# Patient Record
Sex: FEMALE | Race: WHITE | Employment: OTHER | URBAN - METROPOLITAN AREA
[De-identification: names, ages, dates, MRNs, and addresses within clinical notes are randomized per-mention and may not be internally consistent; named-entity substitution may affect disease eponyms.]

---

## 2017-08-29 ENCOUNTER — TELEPHONE (OUTPATIENT)
Dept: CARDIOTHORACIC SURGERY | Age: 77
End: 2017-08-29

## 2018-08-17 ENCOUNTER — TELEPHONE (OUTPATIENT)
Dept: CARDIOTHORACIC SURGERY | Age: 78
End: 2018-08-17

## 2018-08-27 ENCOUNTER — TELEPHONE (OUTPATIENT)
Dept: CARDIOTHORACIC SURGERY | Age: 78
End: 2018-08-27

## 2018-09-11 ENCOUNTER — TELEPHONE (OUTPATIENT)
Dept: CARDIOTHORACIC SURGERY | Age: 78
End: 2018-09-11

## 2018-09-11 NOTE — TELEPHONE ENCOUNTER
Patient is in CT surveillance for ascending aortic aneurysm. Dr. Mary Haddad reviewed CT chest w/o contrast that was performed on 8/28/18 at [address] [place] [address] and states that the aorta is stable and we will repeat the same study in 1 year. Pt is aware and agreeable. Pt placed back into surveillance.

## 2019-09-16 ENCOUNTER — TELEPHONE (OUTPATIENT)
Dept: CARDIOTHORACIC SURGERY | Age: 79
End: 2019-09-16

## 2019-09-16 DIAGNOSIS — I71.21 ASCENDING AORTIC ANEURYSM: Primary | ICD-10-CM

## 2019-10-04 ENCOUNTER — TELEPHONE (OUTPATIENT)
Dept: CARDIOTHORACIC SURGERY | Age: 79
End: 2019-10-04

## 2020-03-13 ENCOUNTER — PREP FOR PROCEDURE (OUTPATIENT)
Dept: OPHTHALMOLOGY | Age: 80
End: 2020-03-13

## 2020-03-13 RX ORDER — PREDNISOLONE ACETATE 10 MG/ML
1 SUSPENSION/ DROPS OPHTHALMIC SEE ADMIN INSTRUCTIONS
Status: CANCELLED | OUTPATIENT
Start: 2020-03-13

## 2020-03-13 RX ORDER — TROPICAMIDE 10 MG/ML
1 SOLUTION/ DROPS OPHTHALMIC SEE ADMIN INSTRUCTIONS
Status: CANCELLED | OUTPATIENT
Start: 2020-03-13

## 2020-03-13 RX ORDER — BRIMONIDINE TARTRATE 2 MG/ML
1 SOLUTION/ DROPS OPHTHALMIC SEE ADMIN INSTRUCTIONS
Status: CANCELLED | OUTPATIENT
Start: 2020-03-13

## 2020-03-13 RX ORDER — PHENYLEPHRINE HYDROCHLORIDE 25 MG/ML
1 SOLUTION/ DROPS OPHTHALMIC SEE ADMIN INSTRUCTIONS
Status: CANCELLED | OUTPATIENT
Start: 2020-03-13

## 2020-06-12 ENCOUNTER — PREP FOR PROCEDURE (OUTPATIENT)
Dept: OPHTHALMOLOGY | Age: 80
End: 2020-06-12

## 2020-06-12 RX ORDER — PHENYLEPHRINE HYDROCHLORIDE 25 MG/ML
1 SOLUTION/ DROPS OPHTHALMIC SEE ADMIN INSTRUCTIONS
Status: CANCELLED | OUTPATIENT
Start: 2020-06-12

## 2020-06-12 RX ORDER — PREDNISOLONE ACETATE 10 MG/ML
1 SUSPENSION/ DROPS OPHTHALMIC SEE ADMIN INSTRUCTIONS
Status: CANCELLED | OUTPATIENT
Start: 2020-06-12

## 2020-06-12 RX ORDER — TROPICAMIDE 10 MG/ML
1 SOLUTION/ DROPS OPHTHALMIC SEE ADMIN INSTRUCTIONS
Status: CANCELLED | OUTPATIENT
Start: 2020-06-12

## 2020-06-12 RX ORDER — BRIMONIDINE TARTRATE 2 MG/ML
1 SOLUTION/ DROPS OPHTHALMIC SEE ADMIN INSTRUCTIONS
Status: CANCELLED | OUTPATIENT
Start: 2020-06-12

## 2020-06-12 NOTE — PROGRESS NOTES
[facility]  [phone]        Pre-Op Phone Call:     Patient Name: Reece Edmonds     Telephone Information:   Mobile [phone]     Home phone:  [phone]    Surgery Time:   [id number] Time:  1503     Left extended Message:  NA     Message left with:     Recent change in health status:  NA     Advised of transportation/ policy:  Call      NPO policy reviewed: Advised to take morning heart/blood pressure medications with sips of water morning of surgery? Instructed to bring eye drops, photo identification, and insurance card day of surgery? Advised to wear short sleeved button down shirt (no T-shirt underneath): Advised not to wear jewelry, hairpins, or pantyhose day of surgery? Advised not to wear make-up and to wash face day of surgery?       Remarks:        Electronically signed by:  Shoaib Colo RN at 6/12/2020 1:33 PM

## 2020-06-15 ENCOUNTER — HOSPITAL ENCOUNTER (OUTPATIENT)
Dept: SURGERY | Age: 80
Setting detail: OUTPATIENT SURGERY
Discharge: HOME | End: 2020-06-15
Payer: MEDICARE

## 2020-10-29 ENCOUNTER — HOSPITAL ENCOUNTER (OUTPATIENT)
Dept: CT IMAGING | Age: 80
Discharge: HOME | End: 2020-10-29
Payer: MEDICARE

## 2020-10-29 PROCEDURE — 71250 CT THORAX DX C-: CPT

## 2020-11-06 ENCOUNTER — TELEPHONE (OUTPATIENT)
Dept: CARDIOTHORACIC SURGERY | Age: 80
End: 2020-11-06

## 2020-11-06 NOTE — TELEPHONE ENCOUNTER
Patient is in surveillance for ascending aortic aneurysm. Dr. Betty Nichols reviewed CT chest w/o contrast performed on 10/29/20 and reports that the aorta is stable and we will repeat the same study in 1 year. Patient is aware and agreeable with plan.

## 2021-11-04 DIAGNOSIS — I71.20 THORACIC AORTIC ANEURYSM WITHOUT RUPTURE: Primary | ICD-10-CM

## 2021-11-17 ENCOUNTER — TELEPHONE (OUTPATIENT)
Dept: CARDIOTHORACIC SURGERY | Age: 81
End: 2021-11-17

## 2021-11-17 NOTE — TELEPHONE ENCOUNTER
Spoke with patient regarding schedule of updated CT chest without contrast for assessment of her ascending aortic aneurysm. Patient is requesting to be scheduled at [facility] authorization for updated scan: [id number] valid 11/17/2021-1/15/2022 per [facility]. I have faxed the orders with authorization to [facility] at [phone] with instructions for them to coordinate schedule with patient. I tried calling patient to relay instructions without success.

## 2021-12-03 ENCOUNTER — TELEPHONE (OUTPATIENT)
Dept: CARDIOTHORACIC SURGERY | Age: 81
End: 2021-12-03

## 2021-12-03 NOTE — TELEPHONE ENCOUNTER
Called patient to inform that Dr. Melina Hernandez reviewed results of CT scan. Patient's aneurysm is stable at this time. Scan should be repeated in 1 year ~ 11/22/2022. Patient agreeable, and verbalized understanding.

## 2023-02-17 ENCOUNTER — HOSPITAL ENCOUNTER (INPATIENT)
Age: 83
LOS: 1 days | Discharge: HOME HEALTH | DRG: 917 | End: 2023-02-18
Attending: EMERGENCY MEDICINE | Admitting: STUDENT IN AN ORGANIZED HEALTH CARE EDUCATION/TRAINING PROGRAM
Payer: MEDICARE

## 2023-02-17 ENCOUNTER — APPOINTMENT (OUTPATIENT)
Dept: GENERAL RADIOLOGY | Age: 83
DRG: 917 | End: 2023-02-17
Payer: MEDICARE

## 2023-02-17 ENCOUNTER — APPOINTMENT (OUTPATIENT)
Dept: CT IMAGING | Age: 83
DRG: 917 | End: 2023-02-17
Payer: MEDICARE

## 2023-02-17 DIAGNOSIS — K63.9 COLON WALL THICKENING: ICD-10-CM

## 2023-02-17 DIAGNOSIS — G89.29 CHRONIC BACK PAIN, UNSPECIFIED BACK LOCATION, UNSPECIFIED BACK PAIN LATERALITY: ICD-10-CM

## 2023-02-17 DIAGNOSIS — T40.601A OPIATE OVERDOSE, ACCIDENTAL OR UNINTENTIONAL, INITIAL ENCOUNTER (HCC): Primary | ICD-10-CM

## 2023-02-17 DIAGNOSIS — M54.9 CHRONIC BACK PAIN, UNSPECIFIED BACK LOCATION, UNSPECIFIED BACK PAIN LATERALITY: ICD-10-CM

## 2023-02-17 DIAGNOSIS — J96.01 ACUTE RESPIRATORY FAILURE WITH HYPOXIA (HCC): ICD-10-CM

## 2023-02-17 DIAGNOSIS — N39.0 ACUTE UTI: ICD-10-CM

## 2023-02-17 DIAGNOSIS — I71.9 AORTIC ANEURYSM WITHOUT RUPTURE, UNSPECIFIED PORTION OF AORTA (HCC): ICD-10-CM

## 2023-02-17 PROBLEM — T50.901A DRUG OVERDOSE, ACCIDENTAL OR UNINTENTIONAL, INITIAL ENCOUNTER: Status: ACTIVE | Noted: 2023-02-17

## 2023-02-17 LAB
A/G RATIO: 1.5 (ref 1.1–2.2)
ALBUMIN SERPL-MCNC: 3.7 G/DL (ref 3.4–5)
ALP BLD-CCNC: 55 U/L (ref 40–129)
ALT SERPL-CCNC: 10 U/L (ref 10–40)
ANION GAP SERPL CALCULATED.3IONS-SCNC: 11 MMOL/L (ref 3–16)
AST SERPL-CCNC: 20 U/L (ref 15–37)
BACTERIA: ABNORMAL /HPF
BASOPHILS ABSOLUTE: 0 K/UL (ref 0–0.2)
BASOPHILS RELATIVE PERCENT: 0.4 %
BILIRUB SERPL-MCNC: 0.3 MG/DL (ref 0–1)
BILIRUBIN URINE: NEGATIVE
BLOOD, URINE: ABNORMAL
BUN BLDV-MCNC: 19 MG/DL (ref 7–20)
CALCIUM SERPL-MCNC: 8.6 MG/DL (ref 8.3–10.6)
CHLORIDE BLD-SCNC: 100 MMOL/L (ref 99–110)
CLARITY: ABNORMAL
CO2: 25 MMOL/L (ref 21–32)
COLOR: YELLOW
CREAT SERPL-MCNC: 1.1 MG/DL (ref 0.6–1.2)
EKG ATRIAL RATE: 70 BPM
EKG DIAGNOSIS: NORMAL
EKG P AXIS: 71 DEGREES
EKG P-R INTERVAL: 186 MS
EKG Q-T INTERVAL: 408 MS
EKG QRS DURATION: 86 MS
EKG QTC CALCULATION (BAZETT): 440 MS
EKG R AXIS: 10 DEGREES
EKG T AXIS: 30 DEGREES
EKG VENTRICULAR RATE: 70 BPM
EOSINOPHILS ABSOLUTE: 0 K/UL (ref 0–0.6)
EOSINOPHILS RELATIVE PERCENT: 0.5 %
EPITHELIAL CELLS, UA: 16 /HPF (ref 0–5)
GFR SERPL CREATININE-BSD FRML MDRD: 51 ML/MIN/{1.73_M2}
GLUCOSE BLD-MCNC: 116 MG/DL (ref 70–99)
GLUCOSE URINE: NEGATIVE MG/DL
HCT VFR BLD CALC: 37.3 % (ref 36–48)
HEMOGLOBIN: 12.4 G/DL (ref 12–16)
HYALINE CASTS: 5 /LPF (ref 0–8)
KETONES, URINE: NEGATIVE MG/DL
LEUKOCYTE ESTERASE, URINE: ABNORMAL
LYMPHOCYTES ABSOLUTE: 1.4 K/UL (ref 1–5.1)
LYMPHOCYTES RELATIVE PERCENT: 20.4 %
MCH RBC QN AUTO: 31.7 PG (ref 26–34)
MCHC RBC AUTO-ENTMCNC: 33.3 G/DL (ref 31–36)
MCV RBC AUTO: 95.4 FL (ref 80–100)
MICROSCOPIC EXAMINATION: YES
MONOCYTES ABSOLUTE: 0.5 K/UL (ref 0–1.3)
MONOCYTES RELATIVE PERCENT: 7.3 %
NEUTROPHILS ABSOLUTE: 4.8 K/UL (ref 1.7–7.7)
NEUTROPHILS RELATIVE PERCENT: 71.4 %
NITRITE, URINE: NEGATIVE
PDW BLD-RTO: 14.1 % (ref 12.4–15.4)
PH UA: 5.5 (ref 5–8)
PLATELET # BLD: 186 K/UL (ref 135–450)
PMV BLD AUTO: 8.3 FL (ref 5–10.5)
POTASSIUM REFLEX MAGNESIUM: 4.3 MMOL/L (ref 3.5–5.1)
PROTEIN UA: NEGATIVE MG/DL
RBC # BLD: 3.91 M/UL (ref 4–5.2)
RBC UA: 2 /HPF (ref 0–4)
SODIUM BLD-SCNC: 136 MMOL/L (ref 136–145)
SPECIFIC GRAVITY UA: 1.01 (ref 1–1.03)
TOTAL PROTEIN: 6.1 G/DL (ref 6.4–8.2)
URINE TYPE: ABNORMAL
UROBILINOGEN, URINE: 0.2 E.U./DL
WBC # BLD: 6.8 K/UL (ref 4–11)
WBC UA: 74 /HPF (ref 0–5)

## 2023-02-17 PROCEDURE — 6360000002 HC RX W HCPCS: Performed by: EMERGENCY MEDICINE

## 2023-02-17 PROCEDURE — 81001 URINALYSIS AUTO W/SCOPE: CPT

## 2023-02-17 PROCEDURE — 80053 COMPREHEN METABOLIC PANEL: CPT

## 2023-02-17 PROCEDURE — 6370000000 HC RX 637 (ALT 250 FOR IP): Performed by: STUDENT IN AN ORGANIZED HEALTH CARE EDUCATION/TRAINING PROGRAM

## 2023-02-17 PROCEDURE — 6370000000 HC RX 637 (ALT 250 FOR IP): Performed by: NURSE PRACTITIONER

## 2023-02-17 PROCEDURE — 99285 EMERGENCY DEPT VISIT HI MDM: CPT

## 2023-02-17 PROCEDURE — 85025 COMPLETE CBC W/AUTO DIFF WBC: CPT

## 2023-02-17 PROCEDURE — 2580000003 HC RX 258: Performed by: EMERGENCY MEDICINE

## 2023-02-17 PROCEDURE — 6360000002 HC RX W HCPCS: Performed by: STUDENT IN AN ORGANIZED HEALTH CARE EDUCATION/TRAINING PROGRAM

## 2023-02-17 PROCEDURE — 71045 X-RAY EXAM CHEST 1 VIEW: CPT

## 2023-02-17 PROCEDURE — 94640 AIRWAY INHALATION TREATMENT: CPT

## 2023-02-17 PROCEDURE — 96374 THER/PROPH/DIAG INJ IV PUSH: CPT

## 2023-02-17 PROCEDURE — 93010 ELECTROCARDIOGRAM REPORT: CPT | Performed by: INTERNAL MEDICINE

## 2023-02-17 PROCEDURE — 2580000003 HC RX 258: Performed by: STUDENT IN AN ORGANIZED HEALTH CARE EDUCATION/TRAINING PROGRAM

## 2023-02-17 PROCEDURE — 1200000000 HC SEMI PRIVATE

## 2023-02-17 PROCEDURE — 6360000004 HC RX CONTRAST MEDICATION: Performed by: EMERGENCY MEDICINE

## 2023-02-17 PROCEDURE — 74177 CT ABD & PELVIS W/CONTRAST: CPT

## 2023-02-17 PROCEDURE — 93005 ELECTROCARDIOGRAM TRACING: CPT | Performed by: EMERGENCY MEDICINE

## 2023-02-17 RX ORDER — SODIUM CHLORIDE 0.9 % (FLUSH) 0.9 %
5-40 SYRINGE (ML) INJECTION PRN
Status: DISCONTINUED | OUTPATIENT
Start: 2023-02-17 | End: 2023-02-18 | Stop reason: HOSPADM

## 2023-02-17 RX ORDER — FLUTICASONE FUROATE, UMECLIDINIUM BROMIDE AND VILANTEROL TRIFENATATE 200; 62.5; 25 UG/1; UG/1; UG/1
1 POWDER RESPIRATORY (INHALATION) DAILY
COMMUNITY
Start: 2023-01-03

## 2023-02-17 RX ORDER — ROPINIROLE 1 MG/1
1 TABLET, FILM COATED ORAL 3 TIMES DAILY
Status: DISCONTINUED | OUTPATIENT
Start: 2023-02-17 | End: 2023-02-18 | Stop reason: HOSPADM

## 2023-02-17 RX ORDER — PYRIDOXINE HCL (VITAMIN B6) 100 MG
500 TABLET ORAL DAILY
COMMUNITY

## 2023-02-17 RX ORDER — SODIUM CHLORIDE 9 MG/ML
INJECTION, SOLUTION INTRAVENOUS PRN
Status: DISCONTINUED | OUTPATIENT
Start: 2023-02-17 | End: 2023-02-18 | Stop reason: HOSPADM

## 2023-02-17 RX ORDER — NICOTINE 21 MG/24HR
1 PATCH, TRANSDERMAL 24 HOURS TRANSDERMAL DAILY
Status: DISCONTINUED | OUTPATIENT
Start: 2023-02-17 | End: 2023-02-18 | Stop reason: HOSPADM

## 2023-02-17 RX ORDER — GABAPENTIN 300 MG/1
300 CAPSULE ORAL 3 TIMES DAILY
COMMUNITY
Start: 2023-02-13 | End: 2023-02-23

## 2023-02-17 RX ORDER — ONDANSETRON 2 MG/ML
4 INJECTION INTRAMUSCULAR; INTRAVENOUS EVERY 6 HOURS PRN
Status: DISCONTINUED | OUTPATIENT
Start: 2023-02-17 | End: 2023-02-18 | Stop reason: HOSPADM

## 2023-02-17 RX ORDER — SODIUM CHLORIDE 0.9 % (FLUSH) 0.9 %
5-40 SYRINGE (ML) INJECTION EVERY 12 HOURS SCHEDULED
Status: DISCONTINUED | OUTPATIENT
Start: 2023-02-17 | End: 2023-02-18 | Stop reason: HOSPADM

## 2023-02-17 RX ORDER — MEMANTINE HYDROCHLORIDE 10 MG/1
10 TABLET ORAL 2 TIMES DAILY
COMMUNITY
Start: 2022-12-01

## 2023-02-17 RX ORDER — OXYCODONE HYDROCHLORIDE 5 MG/1
5 TABLET ORAL EVERY 6 HOURS PRN
Status: DISCONTINUED | OUTPATIENT
Start: 2023-02-17 | End: 2023-02-18 | Stop reason: HOSPADM

## 2023-02-17 RX ORDER — MEMANTINE HYDROCHLORIDE 10 MG/1
10 TABLET ORAL 2 TIMES DAILY
Status: DISCONTINUED | OUTPATIENT
Start: 2023-02-17 | End: 2023-02-18 | Stop reason: HOSPADM

## 2023-02-17 RX ORDER — ALENDRONATE SODIUM 70 MG/1
70 TABLET ORAL
COMMUNITY
Start: 2022-12-07

## 2023-02-17 RX ORDER — ATORVASTATIN CALCIUM 40 MG/1
40 TABLET, FILM COATED ORAL DAILY
Status: ON HOLD | COMMUNITY
Start: 2023-02-04 | End: 2023-02-18 | Stop reason: HOSPADM

## 2023-02-17 RX ORDER — NALOXONE HYDROCHLORIDE 0.4 MG/ML
0.4 INJECTION, SOLUTION INTRAMUSCULAR; INTRAVENOUS; SUBCUTANEOUS PRN
Status: DISCONTINUED | OUTPATIENT
Start: 2023-02-17 | End: 2023-02-18 | Stop reason: HOSPADM

## 2023-02-17 RX ORDER — GABAPENTIN 300 MG/1
300 CAPSULE ORAL 3 TIMES DAILY
Status: DISCONTINUED | OUTPATIENT
Start: 2023-02-17 | End: 2023-02-18 | Stop reason: HOSPADM

## 2023-02-17 RX ORDER — DONEPEZIL HYDROCHLORIDE 10 MG/1
10 TABLET, FILM COATED ORAL NIGHTLY
COMMUNITY
Start: 2022-12-01

## 2023-02-17 RX ORDER — OXYCODONE HYDROCHLORIDE 10 MG/1
10 TABLET ORAL EVERY 4 HOURS PRN
Status: ON HOLD | COMMUNITY
Start: 2023-02-13 | End: 2023-02-18 | Stop reason: SDUPTHER

## 2023-02-17 RX ORDER — AMLODIPINE BESYLATE 5 MG/1
5 TABLET ORAL DAILY
COMMUNITY
Start: 2023-02-04 | End: 2023-02-17

## 2023-02-17 RX ORDER — ACETAMINOPHEN 325 MG/1
650 TABLET ORAL EVERY 6 HOURS PRN
Status: DISCONTINUED | OUTPATIENT
Start: 2023-02-17 | End: 2023-02-18 | Stop reason: HOSPADM

## 2023-02-17 RX ORDER — ATORVASTATIN CALCIUM 40 MG/1
40 TABLET, FILM COATED ORAL DAILY
Status: DISCONTINUED | OUTPATIENT
Start: 2023-02-17 | End: 2023-02-18 | Stop reason: HOSPADM

## 2023-02-17 RX ORDER — POLYETHYLENE GLYCOL 3350 17 G/17G
17 POWDER, FOR SOLUTION ORAL DAILY PRN
Status: DISCONTINUED | OUTPATIENT
Start: 2023-02-17 | End: 2023-02-18 | Stop reason: HOSPADM

## 2023-02-17 RX ORDER — ONDANSETRON 4 MG/1
4 TABLET, ORALLY DISINTEGRATING ORAL EVERY 8 HOURS PRN
Status: DISCONTINUED | OUTPATIENT
Start: 2023-02-17 | End: 2023-02-18 | Stop reason: HOSPADM

## 2023-02-17 RX ORDER — ROPINIROLE 4 MG/1
4 TABLET, FILM COATED ORAL 4 TIMES DAILY PRN
COMMUNITY
Start: 2023-02-04

## 2023-02-17 RX ORDER — ENOXAPARIN SODIUM 100 MG/ML
40 INJECTION SUBCUTANEOUS DAILY
Status: DISCONTINUED | OUTPATIENT
Start: 2023-02-17 | End: 2023-02-18 | Stop reason: HOSPADM

## 2023-02-17 RX ORDER — IPRATROPIUM BROMIDE AND ALBUTEROL SULFATE 2.5; .5 MG/3ML; MG/3ML
1 SOLUTION RESPIRATORY (INHALATION)
Status: DISCONTINUED | OUTPATIENT
Start: 2023-02-17 | End: 2023-02-18 | Stop reason: HOSPADM

## 2023-02-17 RX ORDER — ACETAMINOPHEN 650 MG/1
650 SUPPOSITORY RECTAL EVERY 6 HOURS PRN
Status: DISCONTINUED | OUTPATIENT
Start: 2023-02-17 | End: 2023-02-18 | Stop reason: HOSPADM

## 2023-02-17 RX ORDER — LANOLIN ALCOHOL/MO/W.PET/CERES
3 CREAM (GRAM) TOPICAL NIGHTLY PRN
Status: DISCONTINUED | OUTPATIENT
Start: 2023-02-17 | End: 2023-02-18

## 2023-02-17 RX ORDER — DONEPEZIL HYDROCHLORIDE 10 MG/1
10 TABLET, FILM COATED ORAL NIGHTLY
Status: DISCONTINUED | OUTPATIENT
Start: 2023-02-17 | End: 2023-02-18 | Stop reason: HOSPADM

## 2023-02-17 RX ORDER — AMLODIPINE BESYLATE 5 MG/1
5 TABLET ORAL DAILY
Status: DISCONTINUED | OUTPATIENT
Start: 2023-02-17 | End: 2023-02-18 | Stop reason: HOSPADM

## 2023-02-17 RX ORDER — ALBUTEROL SULFATE 90 UG/1
2 POWDER, METERED RESPIRATORY (INHALATION) EVERY 4 HOURS PRN
COMMUNITY
Start: 2022-12-01

## 2023-02-17 RX ORDER — NALOXONE HYDROCHLORIDE 0.4 MG/ML
0.4 INJECTION, SOLUTION INTRAMUSCULAR; INTRAVENOUS; SUBCUTANEOUS ONCE
Status: COMPLETED | OUTPATIENT
Start: 2023-02-17 | End: 2023-02-17

## 2023-02-17 RX ADMIN — OXYCODONE 5 MG: 5 TABLET ORAL at 18:37

## 2023-02-17 RX ADMIN — ENOXAPARIN SODIUM 40 MG: 100 INJECTION SUBCUTANEOUS at 18:09

## 2023-02-17 RX ADMIN — Medication 3 MG: at 22:38

## 2023-02-17 RX ADMIN — DONEPEZIL HYDROCHLORIDE 10 MG: 10 TABLET, FILM COATED ORAL at 21:05

## 2023-02-17 RX ADMIN — CEFTRIAXONE 1000 MG: 1 INJECTION, POWDER, FOR SOLUTION INTRAMUSCULAR; INTRAVENOUS at 14:53

## 2023-02-17 RX ADMIN — Medication 10 ML: at 21:05

## 2023-02-17 RX ADMIN — NALOXONE HYDROCHLORIDE 0.4 MG: 0.4 INJECTION, SOLUTION INTRAMUSCULAR; INTRAVENOUS; SUBCUTANEOUS at 11:42

## 2023-02-17 RX ADMIN — IPRATROPIUM BROMIDE AND ALBUTEROL SULFATE 1 AMPULE: 2.5; .5 SOLUTION RESPIRATORY (INHALATION) at 19:18

## 2023-02-17 RX ADMIN — IOPAMIDOL 75 ML: 755 INJECTION, SOLUTION INTRAVENOUS at 12:48

## 2023-02-17 RX ADMIN — GABAPENTIN 300 MG: 300 CAPSULE ORAL at 21:05

## 2023-02-17 RX ADMIN — MEMANTINE 10 MG: 10 TABLET ORAL at 21:05

## 2023-02-17 RX ADMIN — ATORVASTATIN CALCIUM 40 MG: 40 TABLET, FILM COATED ORAL at 18:08

## 2023-02-17 RX ADMIN — ROPINIROLE HYDROCHLORIDE 1 MG: 1 TABLET, FILM COATED ORAL at 21:04

## 2023-02-17 ASSESSMENT — PAIN DESCRIPTION - ORIENTATION: ORIENTATION: LEFT

## 2023-02-17 ASSESSMENT — PAIN - FUNCTIONAL ASSESSMENT
PAIN_FUNCTIONAL_ASSESSMENT: ACTIVITIES ARE NOT PREVENTED
PAIN_FUNCTIONAL_ASSESSMENT: NONE - DENIES PAIN

## 2023-02-17 ASSESSMENT — PAIN DESCRIPTION - LOCATION: LOCATION: LEG

## 2023-02-17 ASSESSMENT — PAIN SCALES - GENERAL: PAINLEVEL_OUTOF10: 10

## 2023-02-17 ASSESSMENT — LIFESTYLE VARIABLES
HOW OFTEN DO YOU HAVE A DRINK CONTAINING ALCOHOL: NEVER
HOW MANY STANDARD DRINKS CONTAINING ALCOHOL DO YOU HAVE ON A TYPICAL DAY: PATIENT DOES NOT DRINK

## 2023-02-17 ASSESSMENT — PAIN DESCRIPTION - DESCRIPTORS: DESCRIPTORS: STABBING

## 2023-02-17 NOTE — H&P
Hospital Medicine History & Physical      PCP: Tommy Marroquin MD    Date of Admission: 2/17/2023    Date of Service: Pt seen/examined on 02/17/23   and Admitted to Inpatient. Chief Complaint:  AMS. History Of Present Illness: The patient is a [de-identified] y.o. female who presents to [facility] with altered mental status. Patient with a past medical history of COPD, current smoker, hyperlipidemia, chronic back pain, restless leg syndrome. Patient presented to emergency with altered mental status, history was acquired from the patient as well as her  who is at bedside. Patient had recently seen her pain management physician as well as neurosurgery, and is planned for procedure 2/28 for disc herniation, patient has been prescribed oxycodone 10 mg every 4 hours. As per , patient had started acting abnormally for the past 3 days, initially with forgetfulness however progressed to hallucinations, patient had seen children that were not there, ultimately presented to emergency when entered mental status progressed where patient was confused. Patient received Narcan on presentation with significant improvement, patient  which is at bedside noted that they were missing at least 5 oxycodone pills, patient had accidentally taken oxycodone over the prescribed limit. Patient did not show any signs of suicidal ideation and stated that she had been confused as she had recently seen both Drs. And may have confused instructions. .    Patient denies urinary symptoms however had not been reliable historian. Patient continues to smoke 1 pack for the past 61 years, mother of 9, lives with her .      Early dementia as documented in chart, patient is on memantine    Past Medical History:        Diagnosis Date    Abdominal aortic aneurysm [facility]) 2011    Cranfrancoise    Ascending aortic aneurysm [facility]) 2012    4.4cm 2012, 2015, 2018, 2019, 2020    Chronic obstructive pulmonary disease (COPD) ([facility] [address]     Hyperlipidemia     Pulmonary nodules 2012    Tobacco use disorder        Past Surgical History:        Procedure Laterality Date    ABDOMINAL AORTIC ANEURYSM REPAIR, ENDOVASCULAR  10/5/12    EVAR Zenith Graft    ANKLE FRACTURE SURGERY      APPENDECTOMY      as a child     BREAST BIOPSY Right 8/2015    Benign     CATARACT EXTRACTION W/  INTRAOCULAR LENS IMPLANT Bilateral     CHOLECYSTECTOMY  2006    HYSTERECTOMY, TOTAL ABDOMINAL      TONSILLECTOMY      as a child    TOTAL KNEE ARTHROPLASTY Left        Medications Prior to Admission:    Prior to Admission medications    Medication Sig Start Date End Date Taking? Authorizing Provider   alendronate (FOSAMAX) 70 MG tablet Take 70 mg by mouth every 7 days 12/7/22  Yes Historical Provider, MD   donepezil (ARICEPT) 10 MG tablet Take 10 mg by mouth nightly 12/1/22  Yes Historical Provider, MD   gabapentin (NEURONTIN) 300 MG capsule Take 300 mg by mouth 3 times daily. 2/13/23 2/23/23 Yes Historical Provider, MD   memantine (NAMENDA) 10 MG tablet Take 10 mg by mouth 2 times daily 12/1/22  Yes Historical Provider, MD   PROAIR RESPICLICK 335 (90 Base) MCG/ACT aerosol powder inhalation Inhale 2 puffs into the lungs every 4 hours as needed 12/1/22   Historical Provider, MD   atorvastatin (LIPITOR) 40 MG tablet Take 40 mg by mouth daily 2/4/23   Historical Provider, MD Charley Beaver 200-62.5-25 MCG/ACT AEPB inhaler Inhale 1 puff into the lungs daily 1/3/23   Historical Provider, MD   oxyCODONE HCl (OXY-IR) 10 MG immediate release tablet Take 10 mg by mouth every 4 hours as needed. 2/13/23   Historical Provider, MD   rOPINIRole (REQUIP) 4 MG tablet Take 4 mg by mouth 4 times daily 2/4/23   Historical Provider, MD       Allergies:  Patient has no known allergies.     Social History:  The patient currently lives independently with     TOBACCO:   reports that she has been smoking cigarettes. She has a 100.00 pack-year smoking history. She has never used smokeless tobacco.  ETOH:   reports current alcohol use. Family History:  Reviewed in detail and negative for DM, Early CAD, Cancer, CVA. Positive as follows:        Problem Relation Age of Onset    Stroke Mother     Alzheimer's Disease Mother     Heart Disease Father 64        MI     Cancer Daughter         Nonhodgkins lymphoma        REVIEW OF SYSTEMS:   Positive for altered mental status and as noted in the HPI. All other systems reviewed and negative. PHYSICAL EXAM:    BP (!) 111/57   Pulse 71   Temp 98.4 °F (36.9 °C) (Oral)   Resp 14   Ht 5' 8\" (1.727 m)   Wt 145 lb 8.1 oz (66 kg)   SpO2 92%   BMI 22.12 kg/m²     General appearance: No apparent distress appears stated age and cooperative. HEENT Normal cephalic, atraumatic without obvious deformity. Pupils equal, round, and reactive to light. Extra ocular muscles intact. Conjunctivae/corneas clear. Neck: Supple, No jugular venous distention/bruits. Trachea midline without thyromegaly or adenopathy with full range of motion. Lungs: Clear to auscultation, bilaterally without Rales/Wheezes/Rhonchi with good respiratory effort. Heart: Regular rate and rhythm with Normal S1/S2 without murmurs, rubs or gallops, point of maximum impulse non-displaced  Abdomen: Soft, non-tender or non-distended without rigidity or guarding and positive bowel sounds all four quadrants. Extremities: No clubbing, cyanosis, or edema bilaterally. Full range of motion without deformity and normal gait intact. Skin: Skin color, texture, turgor normal.  No rashes or lesions. Neurologic: Alert and oriented X 3, neurovascularly intact with sensory/motor intact upper extremities/lower extremities, bilaterally.   Cranial nerves: II-XII intact, grossly non-focal.  Mental status: Alert, oriented, thought content appropriate. Capillary Refill: Acceptable  < 3 seconds  Peripheral Pulses: +3 Easily felt, not easily obliterated with pressure    CBC   Recent Labs     02/17/23  1150   WBC 6.8   HGB 12.4   HCT 37.3         RENAL  Recent Labs     02/17/23  1150      K 4.3      CO2 25   BUN 19   CREATININE 1.1     LFT'S  Recent Labs     02/17/23  1150   AST 20   ALT 10   BILITOT 0.3   ALKPHOS 55     COAG  No results for input(s): INR in the last 72 hours. CARDIAC ENZYMES  No results for input(s): CKTOTAL, CKMB, CKMBINDEX, TROPONINI in the last 72 hours. U/A:    Lab Results   Component Value Date/Time    COLORU Yellow 02/17/2023 11:50 AM    WBCUA 74 02/17/2023 11:50 AM    RBCUA 2 02/17/2023 11:50 AM    BACTERIA 4+ 02/17/2023 11:50 AM    CLARITYU CLOUDY 02/17/2023 11:50 AM    SPECGRAV 1.011 02/17/2023 11:50 AM    LEUKOCYTESUR LARGE 02/17/2023 11:50 AM    BLOODU SMALL 02/17/2023 11:50 AM    GLUCOSEU Negative 02/17/2023 11:50 AM       ABG  No results found for: OFA1RAD, BEART, K7PETWLA, PHART, THGBART, PQT0KIV, PO2ART, NEJ2PBR        Active Hospital Problems    Diagnosis Date Noted    Drug overdose, accidental or unintentional, initial encounter Barb Librados 02/17/2023     Priority: Medium         PHYSICIANS CERTIFICATION:    I certify that Boykin Spatz is expected to be hospitalized for more  than 2 midnights based on the following assessment and plan:      ASSESSMENT/PLAN:    Altered mental status. Accidental opioid overdose. Patient had been on oxycodone for chronic back pain, patient had accidentally ingested ? 5 tablets of oxycodone 10 mg over the span of 24 hours. Patient received Narcan on presentation with improvement in her mental status.    Plan  -Observe for the next 24 hours  -Reduce oxycodone to 5 mg every 8 hours  -Narcan as needed ordered.  -Patient and  were educated about importance of medication adherence and dangers of overdose especially with opioids    Wall thickening in descending colon incidentally found. CT abdomen showed short segment strictures and wall thickening, patient denied any black stools. We will consult gastroenterology. Chronic back pain. Reduce dose of oxycodone. Dementia. Continue memantine. Restless leg syndrome   Continue home medications    Hyperlipidemia  Continue statin. DVT Prophylaxis: lovenox  Diet: No diet orders on file  Code Status: No Order  PT/OT Eval Status: pending clinical improvement      Dispo - pending clinical improvement         Brown Wilhelm MD    Thank you Berta Ojeda MD for the opportunity to be involved in this patient's care. If you have any questions or concerns please feel free to contact me at [phone].

## 2023-02-17 NOTE — ACP (ADVANCE CARE PLANNING)
Advance Care Planning     Advance Care Planning Activator (Inpatient)  Conversation Note      Date of ACP Conversation: 2/17/2023     Conversation Conducted with: Patient with Decision Making Capacity    ACP Activator: Allison Anderson Maker:     Current Designated Health Care Decision Maker:     Primary Decision Maker: Leandro Olea 304 - [phone]    Today we documented Decision Maker(s) consistent with Legal Next of Kin hierarchy. Care Preferences    Ventilation: \"If you were in your present state of health and suddenly became very ill and were unable to breathe on your own, what would your preference be about the use of a ventilator (breathing machine) if it were available to you? \"      Would the patient desire the use of ventilator (breathing machine)?: yes    \"If your health worsens and it becomes clear that your chance of recovery is unlikely, what would your preference be about the use of a ventilator (breathing machine) if it were available to you? \"     Would the patient desire the use of ventilator (breathing machine)?: Yes      Resuscitation  \"CPR works best to restart the heart when there is a sudden event, like a heart attack, in someone who is otherwise healthy. Unfortunately, CPR does not typically restart the heart for people who have serious health conditions or who are very sick. \"    \"In the event your heart stopped as a result of an underlying serious health condition, would you want attempts to be made to restart your heart (answer \"yes\" for attempt to resuscitate) or would you prefer a natural death (answer \"no\" for do not attempt to resuscitate)? \" yes       [] Yes   [] No   Educated Patient / Cait Gunn regarding differences between Advance Directives and portable DNR orders.     Length of ACP Conversation in minutes:      Conversation Outcomes:  [x] ACP discussion completed  [] Existing advance directive reviewed with patient; no changes to patient's previously recorded wishes  [] New Advance Directive completed  [] Portable Do Not Rescitate prepared for Provider review and signature  [] POLST/POST/MOLST/MOST prepared for Provider review and signature      Follow-up plan:    [] Schedule follow-up conversation to continue planning  [] Referred individual to Provider for additional questions/concerns   [] Advised patient/agent/surrogate to review completed ACP document and update if needed with changes in condition, patient preferences or care setting    [x] This note routed to one or more involved healthcare providers

## 2023-02-17 NOTE — CARE COORDINATION
INITIAL CASE MANAGEMENT ASSESSMENT    Reviewed chart, met with patient to assess possible discharge needs. Explained Case Management role/services. Living Situation: Lives with her , their home office is also in the home and their son works there, so he is there every day. [place] with [place]. ADLs: Independent     DME: none    PT/OT Recs: TBD     Active Services: None     Transportation: active      Medications: [address]    PCP: Dr. Tomer Whitfield       HD/PD: N/A    PLAN/COMMENTS: return home with family support. Advanced Care planning completed. Provided home care and snf list.  Patient has upcoming surgery at [address] [place] [address] and provided list for ARU and Private pay private duty. No discharge needs identified at this time. The Plan for Transition of Care is related to the following treatment goals: return home    The Patient was provided with a choice of provider and agrees   with the discharge plan. [x] Yes [] No    Freedom of choice list was provided with basic dialogue that supports the patient's individualized plan of care/goals, treatment preferences and shares the quality data associated with the providers. [x] Yes [] No    SW/CM provided contact information for patient or family to call with any questions. SW/CM will follow and assist as needed.

## 2023-02-17 NOTE — ED NOTES
Report called to [facility]. Pt is ready for transport.      Joaquim Curtis RN  02/17/23 [id number]

## 2023-02-17 NOTE — PROGRESS NOTES
Pt arrived from ED - Pt AOX4 Acclimated pt to room and call light. Pt denied n/v, SOB, Diarrhea and pain. Pt had no additional needs at this time. Bed locked and lowest position with call light and bed side table w/in reach.  Non skid socks on COLT on

## 2023-02-17 NOTE — CONSULTS
GASTROENTEROLOGY INPATIENT CONSULTATION  Kindel:  I interviewed and examined the patient and reviewed the relevant lab and radiologic studies. Please see below note. I agree with Sam Vegas's findings. Objective: comfortable. Abdomen soft nt/nd    I reviewed the CAT scan and I do not appreciate a significant colitis. Impression:   Abnormal CT scan with reading of descending colon stricture and right pericolonic fat stranding, asymptomatic and abdominal exam benign  With normal white count and no fever  Opiate overdose  Status postcholecystectom   Status post appendectomy  Chronic lower leg pain  6. Esophageal dysphagia  7. Average risk for colon cancer, negative colonoscopy 2016 with Dr. Bear Found:  1. Omeprazole OTC 20 mg p.o. daily  2. Follow-up with Dr. Kyung Hoffman as an outpatient to discuss EGD and colonoscopy  3. I will sign off; please call with questions or problems        IDENTIFYING DATA/REASON FOR CONSULTATION   PATIENT:  Lissette Garcia  MRN:  [id number]  ADMIT DATE: 2/17/2023  TIME OF EVALUATION: 2/17/2023 2:47 PM  HOSPITAL STAY:   LOS: 0 days     REASON FOR CONSULTATION:  abnormal CT     HISTORY OF PRESENT ILLNESS   Lissette Garcia is a [de-identified] y.o. female with a PMH of COPD, HLD, AAA repair, appendectomy, hysterectomy, cholecystectomy, chronic leg pain who presented on 2/17/2023 with concern for accidental opiate overdose. A few days ago she had her oxycodone dose increased 5mg TID to 10mg QID by her PCP. According to her , who is at bedside at time of visit, pt was acting very confused and hallucinating last night and this morning. Work up in ED with CT A/P showed short segment stricture and wall thickening involving the descending colon concerning for underlying malignancy, irregular wall thickening of the rectum and pericolonic fat stranding and wall thickening of the ascending and transverse colon.   We are consulted regarding these findings    At time of visit pt denies abdominal pain, recent change in bowel pattern, hematochezia, or rectal bleeding. She states occasionally she experiencing short bouts of diarrhea and constipation which last only temporarily. Her bowel pattern otherwise is normal.  She denies recent fever or chills. She denies unexplained weight loss. She denies any family history of colon cancer. Prior Endoscopic Evaluations:  EGD/Colonoscopy 9/2016   1. Esophagus: Multiple whitish exudates were seen in the proximal and midesophagus consistent with Candida esophagitis. Small ulcerations were present at the gastroesophageal junction consistent with reflux esophagitis. A small nodule was present at the gastroesophageal junction. Biopsies were obtained and sent for pathology. The appearance is most likely related to inflammation as opposed to malignancy. 2.Stomach: Multiple small shallow and linear gastric ulcerations were present in the antrum and body of the stomach. Biopsies were obtained and sent for pathology. 3.Duodenum: Duodenal mucosa was unremarkable. Biopsies were obtained . [facility] Normal mucosa throughout the colon  . Minimal diverticular disease in the sigmoid colon  . A diminutive polyp was removed from the sigmoid colon with the biopsy forceps. . A sessile polyp measuring 1 cm in size was removed from the rectum with a hot snare.   . Small internal hemorrhoids      PAST MEDICAL, SURGICAL, FAMILY, and SOCIAL HISTORY     Past Medical History:   Diagnosis Date    Abdominal aortic aneurysm ([address] 2011    [place]    Ascending aortic aneurysm ([address] 2012    4.4cm 2012, 2015, 2018, 2019, 2020    Chronic obstructive pulmonary disease (COPD) ([address]     Hyperlipidemia     Pulmonary nodules 2012    Tobacco use disorder      Past Surgical History:   Procedure Laterality Date    ABDOMINAL AORTIC ANEURYSM REPAIR, ENDOVASCULAR  10/5/12    EVAR Zenith Graft    ANKLE FRACTURE SURGERY      APPENDECTOMY      as a child     BREAST BIOPSY Right 8/2015    Benign CATARACT EXTRACTION W/  INTRAOCULAR LENS IMPLANT Bilateral     CHOLECYSTECTOMY  2006    HYSTERECTOMY, TOTAL ABDOMINAL      TONSILLECTOMY      as a child    TOTAL KNEE ARTHROPLASTY Left      Family History   Problem Relation Age of Onset    Stroke Mother     Alzheimer's Disease Mother     Heart Disease Father 64        MI     Cancer Daughter         Nonhodgkins lymphoma      Social History     Socioeconomic History    Marital status:    Tobacco Use    Smoking status: Every Day     Packs/day: 2.00     Years: 50.00     Pack years: 100.00     Types: Cigarettes    Smokeless tobacco: Never    Tobacco comments:     started age 25   Substance and Sexual Activity    Alcohol use: Yes     Alcohol/week: 0.0 standard drinks     Comment: 1 glass of wine per year. Drug use: No       MEDICATIONS   SCHEDULED:  cefTRIAXone (ROCEPHIN) IV, 1,000 mg, Once  nicotine, 1 patch, Daily      FLUIDS/DRIPS:    PRNs: oxyCODONE, 5 mg, Q6H PRN  naloxone, 0.4 mg, PRN      ALLERGIES:  She No Known Allergies    REVIEW OF SYSTEMS   Pertinent ROS noted in HPI    PHYSICAL EXAM     Vitals:    02/17/23 1245 02/17/23 1300 02/17/23 1315 02/17/23 1415   BP: (!) 119/56 (!) 112/50  (!) 111/57   Pulse: 70 74  71   Resp: 12 15  14   Temp:    98.4 °F (36.9 °C)   TempSrc:    Oral   SpO2: 93% 91% 92% 92%   Weight:       Height:           No intake/output data recorded. Physical Exam:  General appearance: alert, cooperative, no distress, appears stated age  Eyes: Anicteric  Head: Normocephalic, without obvious abnormality  Lungs: clear to auscultation bilaterally, Normal Effort  Heart: regular rate and rhythm, normal S1 and S2, no murmurs or rubs  Abdomen: soft, non-distended, non-tender. Bowel sounds normal. No masses,  no organomegaly.    Extremities: atraumatic, no cyanosis or edema  Skin: warm and dry, no jaundice  Neuro: Grossly intact, A&OX3      LABS AND IMAGING   Laboratory   Recent Labs     02/17/23  1150   WBC 6.8   HGB 12.4   HCT 37.3 MCV 95.4        Recent Labs     02/17/23  1150      K 4.3      CO2 25   BUN 19   CREATININE 1.1     Recent Labs     02/17/23  1150   AST 20   ALT 10   BILITOT 0.3   ALKPHOS 55     No results for input(s): LIPASE, AMYLASE in the last 72 hours. No results for input(s): PROTIME, INR in the last 72 hours. Imaging  CT ABDOMEN PELVIS W IV CONTRAST Additional Contrast? None   Final Result   Short segment strictures and wall thickening involving the descending colon   concerning for underlying malignancy. Additional irregular wall thickening   of the rectum. Consideration for follow-up colonoscopy recommended for   further characterization. Wall thickening with pericolonic fat stranding involving the ascending and   transverse colon suggestive of colitis. Small cystic lesion along the anterior right aspect of the pericardium   suggestive of a benign pericardial cyst.      Borderline aneurysmal dilatation of the descending thoracic aorta measuring   3.9 cm. ASSESSMENT AND RECOMMENDATIONS   [de-identified] y.o. female with a PMH of COPD, HLD, AAA repair, appendectomy, hysterectomy, cholecystectomy, chronic leg pain who presented on 2/17/2023 with concern for accidental opiate overdose. GI consulted for abnormal CT findings    IMPRESSION/RECOMMENDATIONS:  Colonic wall thickening with short segment stricturing. No associated abd pain, diarrhea, hematochezia, rectal bleeding. No fevers or leukocytosis. LAst colonoscopy 2016 showed normal colonic mucosa throughout, minimal diverticular disease, diminutive polyp sigmoid colon. Recommend supportive care. No indication from GI standpoint for antibiotics. Recommend follow up colonoscopy. Will set this up with Dr Elizabeth Ziegler as outpatient. ? opioid overdose.   Management per primary team          If you have any questions or need any further information, please feel free to contact our consult team.  Thank you for allowing us to participate in the care of [facility]. The note was completed using Dragon voice recognition transcription. Every effort was made to ensure accuracy; however, inadvertent transcription errors may be present despite my best efforts to edit errors.       Red Boyd PA-C

## 2023-02-17 NOTE — ED PROVIDER NOTES
[facility]    CHIEF COMPLAINT  Drug Overdose (Pt arrives from home with ems, c/o she took too many oxycodone this morning. Pt states she took 2 10 mg pills at 7 am. She arrives 81% ra. Drowsy with Pinpoint pupils. Friend called ems. Pt takes pills for back and bilateral leg pain.)       HISTORY OF PRESENT ILLNESS  Darrel Mc is a [de-identified] y.o. female who presents to the ED with concern for accidental opiate overdose. Patient is somewhat of a poor historian given current medical status however states she takes oxycodone for chronic lower extremity pain. I note a telephone encounter from earlier today to Onelia Mckeon LPN from \"[employer]\". Patient reports Eliseo Medrano is a friend of hers. She indicated concern that the medication patient has been taking as prescribed by Dr. Pelon Mayberry was much stronger than the previous medication prescribed by Dr. Robina Mtz resulting in patient hallucinating, acting inappropriately.     I have reviewed the following from the nursing documentation:    Past Medical History:   Diagnosis Date    Abdominal aortic [facility] ([address] [facility]    Ascending aortic aneurysm ([address] 2012    4.4cm 2012, 2015, 2018, 2019, 2020    Chronic obstructive pulmonary disease (COPD) ([address]     Hyperlipidemia     Pulmonary nodules 2012    Tobacco use disorder      Past Surgical History:   Procedure Laterality Date    ABDOMINAL AORTIC ANEURYSM REPAIR, ENDOVASCULAR  10/5/12    EVAR [facility]    ANKLE FRACTURE SURGERY      APPENDECTOMY      as a child     BREAST BIOPSY Right 8/2015    Benign     CATARACT EXTRACTION W/  INTRAOCULAR LENS IMPLANT Bilateral     CHOLECYSTECTOMY  2006    HYSTERECTOMY, TOTAL ABDOMINAL      TONSILLECTOMY      as a child    TOTAL KNEE ARTHROPLASTY Left      Family History   Problem Relation Age of Onset    Stroke Mother     Alzheimer's Disease Mother     Heart Disease Father 64        MI     Cancer Daughter         Nonhodgkins lymphoma      Social History Socioeconomic History    Marital status:      Spouse name: Not on file    Number of children: Not on file    Years of education: Not on file    Highest education level: Not on file   Occupational History    Not on file   Tobacco Use    Smoking status: Every Day     Packs/day: 2.00     Years: 50.00     Pack years: 100.00     Types: Cigarettes    Smokeless tobacco: Never    Tobacco comments:     started age 25   Substance and Sexual Activity    Alcohol use: Yes     Alcohol/week: 0.0 standard drinks     Comment: 1 glass of wine per year. Drug use: No    Sexual activity: Not on file   Other Topics Concern    Not on file   Social History Narrative    Not on file     Social Determinants of Health     Financial Resource Strain: Not on file   Food Insecurity: Not on file   Transportation Needs: Not on file   Physical Activity: Not on file   Stress: Not on file   Social Connections: Not on file   Intimate Partner Violence: Not on file   Housing Stability: Not on file     Current Facility-Administered Medications   Medication Dose Route Frequency Provider Last Rate Last Admin    cefTRIAXone (ROCEPHIN) 1,000 mg in sodium chloride 0.9 % 50 mL IVPB (mini-bag)  1,000 mg IntraVENous Once Brijesh Norwood  mL/hr at 02/17/23 1453 1,000 mg at 02/17/23 1453    nicotine (NICODERM CQ) 14 MG/24HR 1 patch  1 patch TransDERmal Daily Jazmyn Bah MD        oxyCODONE (ROXICODONE) immediate release tablet 5 mg  5 mg Oral Q6H PRN Jazmyn Bah MD        naloxone [facility]) injection 0.4 mg  0.4 mg IntraVENous PRN Jazmyn Bah MD         Current Outpatient Medications   Medication Sig Dispense Refill    alendronate (FOSAMAX) 70 MG tablet Take 70 mg by mouth every 7 days      donepezil (ARICEPT) 10 MG tablet Take 10 mg by mouth nightly      gabapentin (NEURONTIN) 300 MG capsule Take 300 mg by mouth 3 times daily.       memantine (NAMENDA) 10 MG tablet Take 10 mg by mouth 2 times daily      vitamin D (CHOLECALCIFEROL) 25 MCG (1000 UT) TABS tablet Take 1,000 Units by mouth daily      Cranberry 500 MG CAPS Take 500 mg by mouth daily      PROAIR RESPICLICK 099 (90 Base) MCG/ACT aerosol powder inhalation Inhale 2 puffs into the lungs every 4 hours as needed      atorvastatin (LIPITOR) 40 MG tablet Take 40 mg by mouth daily      TRELEGY ELLIPTA 200-62.5-25 MCG/ACT AEPB inhaler Inhale 1 puff into the lungs daily      oxyCODONE HCl (OXY-IR) 10 MG immediate release tablet Take 10 mg by mouth every 4 hours as needed. rOPINIRole (REQUIP) 4 MG tablet Take 4 mg by mouth 4 times daily as needed (restless legs)       No Known Allergies      PHYSICAL EXAM  ED Triage Vitals [02/17/23 1044]   BP Temp Temp Source Heart Rate Resp SpO2 Height Weight   (!) 97/56 98.4 °F (36.9 °C) Oral 79 18 (!) 81 % 5' 8\" (1.727 m) 145 lb 8.1 oz (66 kg)     General appearance: Appears sleepy. Cooperative. No acute distress. HEENT: Normocephalic. Atraumatic. Mucous membranes are moist. Pinpoint pupils, symmetric. Heart/Chest: RRR. No murmurs. Lungs: Respirations unlabored. CTAB. Good air exchange. Speaking comfortably in full sentences. Abdomen: Soft. Non-tender. Non-distended. No rebound or guarding. Musculoskeletal: No extremity edema. No deformity. Skin: Warm and dry. No acute rashes. Neurological: Sleepy but rouses to voice. CN II-XII intact. Strength 5/5 bilateral upper and lower extremities. Sensation intact to light touch. Gait not assessed. LABS  I have reviewed all labs for this visit.    Results for orders placed or performed during the hospital encounter of 02/17/23   CBC with Auto Differential   Result Value Ref Range    WBC 6.8 4.0 - 11.0 K/uL    RBC 3.91 (L) 4.00 - 5.20 M/uL    Hemoglobin 12.4 12.0 - 16.0 g/dL    Hematocrit 37.3 36.0 - 48.0 %    MCV 95.4 80.0 - 100.0 fL    MCH 31.7 26.0 - 34.0 pg    MCHC 33.3 31.0 - 36.0 g/dL    RDW 14.1 12.4 - 15.4 %    Platelets 479 762 - 088 K/uL    MPV 8.3 5.0 - 10.5 fL Neutrophils % 71.4 %    Lymphocytes % 20.4 %    Monocytes % 7.3 %    Eosinophils % 0.5 %    Basophils % 0.4 %    Neutrophils Absolute 4.8 1.7 - 7.7 K/uL    Lymphocytes Absolute 1.4 1.0 - 5.1 K/uL    Monocytes Absolute 0.5 0.0 - 1.3 K/uL    Eosinophils Absolute 0.0 0.0 - 0.6 K/uL    Basophils Absolute 0.0 0.0 - 0.2 K/uL   Comprehensive Metabolic Panel w/ Reflex to MG   Result Value Ref Range    Sodium 136 136 - 145 mmol/L    Potassium reflex Magnesium 4.3 3.5 - 5.1 mmol/L    Chloride 100 99 - 110 mmol/L    CO2 25 21 - 32 mmol/L    Anion Gap 11 3 - 16    Glucose 116 (H) 70 - 99 mg/dL    BUN 19 7 - 20 mg/dL    Creatinine 1.1 0.6 - 1.2 mg/dL    Est, Glom Filt Rate 51 (A) >60    Calcium 8.6 8.3 - 10.6 mg/dL    Total Protein 6.1 (L) 6.4 - 8.2 g/dL    Albumin 3.7 3.4 - 5.0 g/dL    Albumin/Globulin Ratio 1.5 1.1 - 2.2    Total Bilirubin 0.3 0.0 - 1.0 mg/dL    Alkaline Phosphatase 55 40 - 129 U/L    ALT 10 10 - 40 U/L    AST 20 15 - 37 U/L   Urinalysis with Microscopic   Result Value Ref Range    Color, UA Yellow Straw/Yellow    Clarity, UA CLOUDY (A) Clear    Glucose, Ur Negative Negative mg/dL    Bilirubin Urine Negative Negative    Ketones, Urine Negative Negative mg/dL    Specific Gravity, UA 1.011 1.005 - 1.030    Blood, Urine SMALL (A) Negative    pH, UA 5.5 5.0 - 8.0    Protein, UA Negative Negative mg/dL    Urobilinogen, Urine 0.2 <2.0 E.U./dL    Nitrite, Urine Negative Negative    Leukocyte Esterase, Urine LARGE (A) Negative    Microscopic Examination YES     Urine Type NotGiven     Bacteria, UA 4+ (A) None Seen /HPF    Hyaline Casts, UA 5 0 - 8 /LPF    WBC, UA 74 (H) 0 - 5 /HPF    RBC, UA 2 0 - 4 /HPF    Epithelial Cells, UA 16 (H) 0 - 5 /HPF   EKG 12 Lead   Result Value Ref Range    Ventricular Rate 70 BPM    Atrial Rate 70 BPM    P-R Interval 186 ms    QRS Duration 86 ms    Q-T Interval 408 ms    QTc Calculation (Bazett) 440 ms    P Axis 71 degrees    R Axis 10 degrees    T Axis 30 degrees    Diagnosis Sinus rhythm with marked sinus arrhythmiaOtherwise normal ECGNo previous ECGs available         RADIOLOGY  I have reviewed all radiographic studies for this visit. XR CHEST PORTABLE   Final Result   Negative chest radiograph. CT ABDOMEN PELVIS W IV CONTRAST Additional Contrast? None   Final Result   Short segment strictures and wall thickening involving the descending colon   concerning for underlying malignancy. Additional irregular wall thickening   of the rectum. Consideration for follow-up colonoscopy recommended for   further characterization. Wall thickening with pericolonic fat stranding involving the ascending and   transverse colon suggestive of colitis. Small cystic lesion along the anterior right aspect of the pericardium   suggestive of a benign pericardial cyst.      Borderline aneurysmal dilatation of the descending thoracic aorta measuring   3.9 cm. ECG  EKG interpreted by myself. Rate: normal  Rhythm: Normal sinus with sinus arrhythmia   Axis: normal  Intervals: within normal limits  ST Segments: no acute abnormality  T waves: no acute abnormality  Comparison: no prior   Impression: Normal sinus with sinus arrhythmia     ED COURSE/MDM    [de-identified] y.o. female presents with concern for accidental opiate overdose. On arrival patient is altered and with pinpoint pupils and respiratory depression. She was administered 0.4 mg of naloxone with improvement in her mental status. She remains with 3 to 4 L nasal cannula oxygen requirement. CXR negative to my read. Lung fields are clear to auscultation. Favored secondary to sedating medications. Patient had CT abdomen and pelvis performed for complaint of abdominal pain. Showed some wall thickening of the descending and colon concerning for the possibility of malignancy. Patient advised of the incidental finding. Also borderline aneurysmal dilation of the descending thoracic aorta.   Urinalysis looks consistent with acute urinary tract infection. Patient was administered ceftriaxone. Admit to hospital medicine for further evaluation and treatment. - Consults:   IP CONSULT TO HOSPITALIST  IP CONSULT TO GI       Is this patient to be included in the SEP-1 Core Measure? No   Exclusion criteria - the patient is NOT to be included for SEP-1 Core Measure due to:  2+ SIRS criteria are not met    ITricia MD, am the primary clinician of record. During the patient's ED course, the patient was given:  Medications   cefTRIAXone (ROCEPHIN) 1,000 mg in sodium chloride 0.9 % 50 mL IVPB (mini-bag) (1,000 mg IntraVENous New Bag 2/17/23 1453)   nicotine (NICODERM CQ) 14 MG/24HR 1 patch (has no administration in time range)   oxyCODONE (ROXICODONE) immediate release tablet 5 mg (has no administration in time range)   naloxone [facility]) injection 0.4 mg (has no administration in time range)   naloxone [facility]) injection 0.4 mg (0.4 mg IntraVENous Given by Other 2/17/23 1142)   iopamidol (ISOVUE-370) 76 % injection 75 mL (75 mLs IntraVENous Given 2/17/23 1248)        All questions were answered and the patient/family expressed understanding and agreement with the plan. PROCEDURES3  None    CRITICAL CARE  Total critical care time provided today thus far was 31 minutes. This excludes seperately billable procedures. Critical care time was provided for acute hypoxic respiratory failure requiring administration of naloxone and application of nasal cannula that required close evaluation and/or intervention with concern for potential patient decompensation. CLINICAL IMPRESSION  1. Opiate overdose, accidental or unintentional, initial encounter ([facility] [address]    2. Acute respiratory failure with hypoxia (HCC)    3. Acute UTI    4. Colon wall thickening    5.  Aortic aneurysm without rupture, unspecified portion of aorta ([address]        DISPOSITION  Admitted 02/17/2023 02:25:08 PM     Tricia Gomez MD    Note: This chart was created using voice recognition dictation software. Efforts were made by me to ensure accuracy, however some errors may be present due to limitations of this technology and occasionally words are not transcribed correctly.         Aba Howe MD  02/17/23 2296

## 2023-02-17 NOTE — ED NOTES
Meal tray ordered for pt to her room 2955 , transport already requested.      Michelle Blake, RN  02/17/23 9277

## 2023-02-17 NOTE — PROGRESS NOTES
Pharmacy Medication Reconciliation Note     List of medications patient is currently taking is complete. Source of information:   1. Conversation with patient   2. EMR    Notes regarding home medications:   1. Patient states she did not take any medications besides her oxycodone this morning.       [id number] [facility] Bronwyn Rhoades  2/17/2023 2:59 PM

## 2023-02-18 VITALS
TEMPERATURE: 97.9 F | SYSTOLIC BLOOD PRESSURE: 130 MMHG | OXYGEN SATURATION: 95 % | HEIGHT: 68 IN | BODY MASS INDEX: 22.05 KG/M2 | WEIGHT: 145.5 LBS | RESPIRATION RATE: 20 BRPM | DIASTOLIC BLOOD PRESSURE: 59 MMHG | HEART RATE: 77 BPM

## 2023-02-18 LAB
ANION GAP SERPL CALCULATED.3IONS-SCNC: 10 MMOL/L (ref 3–16)
BASOPHILS ABSOLUTE: 0 K/UL (ref 0–0.2)
BASOPHILS RELATIVE PERCENT: 0.6 %
BUN BLDV-MCNC: 21 MG/DL (ref 7–20)
CALCIUM SERPL-MCNC: 8.6 MG/DL (ref 8.3–10.6)
CHLORIDE BLD-SCNC: 101 MMOL/L (ref 99–110)
CO2: 28 MMOL/L (ref 21–32)
CREAT SERPL-MCNC: 1.1 MG/DL (ref 0.6–1.2)
EOSINOPHILS ABSOLUTE: 0.1 K/UL (ref 0–0.6)
EOSINOPHILS RELATIVE PERCENT: 1.7 %
GFR SERPL CREATININE-BSD FRML MDRD: 51 ML/MIN/{1.73_M2}
GLUCOSE BLD-MCNC: 111 MG/DL (ref 70–99)
HCT VFR BLD CALC: 38 % (ref 36–48)
HEMOGLOBIN: 12.9 G/DL (ref 12–16)
LYMPHOCYTES ABSOLUTE: 1.5 K/UL (ref 1–5.1)
LYMPHOCYTES RELATIVE PERCENT: 26.6 %
MCH RBC QN AUTO: 32.1 PG (ref 26–34)
MCHC RBC AUTO-ENTMCNC: 34 G/DL (ref 31–36)
MCV RBC AUTO: 94.3 FL (ref 80–100)
MONOCYTES ABSOLUTE: 0.4 K/UL (ref 0–1.3)
MONOCYTES RELATIVE PERCENT: 7.1 %
NEUTROPHILS ABSOLUTE: 3.7 K/UL (ref 1.7–7.7)
NEUTROPHILS RELATIVE PERCENT: 64 %
PDW BLD-RTO: 13.9 % (ref 12.4–15.4)
PLATELET # BLD: 185 K/UL (ref 135–450)
PMV BLD AUTO: 8.2 FL (ref 5–10.5)
POTASSIUM SERPL-SCNC: 3.8 MMOL/L (ref 3.5–5.1)
RBC # BLD: 4.02 M/UL (ref 4–5.2)
SODIUM BLD-SCNC: 139 MMOL/L (ref 136–145)
WBC # BLD: 5.7 K/UL (ref 4–11)

## 2023-02-18 PROCEDURE — 2580000003 HC RX 258: Performed by: STUDENT IN AN ORGANIZED HEALTH CARE EDUCATION/TRAINING PROGRAM

## 2023-02-18 PROCEDURE — 6370000000 HC RX 637 (ALT 250 FOR IP): Performed by: NURSE PRACTITIONER

## 2023-02-18 PROCEDURE — 6360000002 HC RX W HCPCS: Performed by: STUDENT IN AN ORGANIZED HEALTH CARE EDUCATION/TRAINING PROGRAM

## 2023-02-18 PROCEDURE — 94680 O2 UPTK RST&XERS DIR SIMPLE: CPT

## 2023-02-18 PROCEDURE — 94640 AIRWAY INHALATION TREATMENT: CPT

## 2023-02-18 PROCEDURE — 6370000000 HC RX 637 (ALT 250 FOR IP): Performed by: STUDENT IN AN ORGANIZED HEALTH CARE EDUCATION/TRAINING PROGRAM

## 2023-02-18 PROCEDURE — 80048 BASIC METABOLIC PNL TOTAL CA: CPT

## 2023-02-18 PROCEDURE — 85025 COMPLETE CBC W/AUTO DIFF WBC: CPT

## 2023-02-18 PROCEDURE — 94761 N-INVAS EAR/PLS OXIMETRY MLT: CPT

## 2023-02-18 PROCEDURE — 36415 COLL VENOUS BLD VENIPUNCTURE: CPT

## 2023-02-18 PROCEDURE — 2700000000 HC OXYGEN THERAPY PER DAY

## 2023-02-18 RX ORDER — CEPHALEXIN 500 MG/1
500 CAPSULE ORAL 4 TIMES DAILY
Qty: 8 CAPSULE | Refills: 0 | Status: SHIPPED | OUTPATIENT
Start: 2023-02-18 | End: 2023-02-20

## 2023-02-18 RX ORDER — LANOLIN ALCOHOL/MO/W.PET/CERES
3 CREAM (GRAM) TOPICAL ONCE
Status: COMPLETED | OUTPATIENT
Start: 2023-02-18 | End: 2023-02-18

## 2023-02-18 RX ORDER — LANOLIN ALCOHOL/MO/W.PET/CERES
6 CREAM (GRAM) TOPICAL NIGHTLY PRN
Status: DISCONTINUED | OUTPATIENT
Start: 2023-02-18 | End: 2023-02-18 | Stop reason: HOSPADM

## 2023-02-18 RX ORDER — OXYCODONE HYDROCHLORIDE 10 MG/1
5 TABLET ORAL EVERY 6 HOURS PRN
Qty: 1 TABLET | Refills: 0
Start: 2023-02-18 | End: 2023-02-21

## 2023-02-18 RX ADMIN — ROPINIROLE HYDROCHLORIDE 1 MG: 1 TABLET, FILM COATED ORAL at 09:38

## 2023-02-18 RX ADMIN — MEMANTINE 10 MG: 10 TABLET ORAL at 09:38

## 2023-02-18 RX ADMIN — ACETAMINOPHEN 650 MG: 325 TABLET ORAL at 01:38

## 2023-02-18 RX ADMIN — Medication 3 MG: at 03:10

## 2023-02-18 RX ADMIN — ENOXAPARIN SODIUM 40 MG: 100 INJECTION SUBCUTANEOUS at 09:39

## 2023-02-18 RX ADMIN — IPRATROPIUM BROMIDE AND ALBUTEROL SULFATE 1 AMPULE: 2.5; .5 SOLUTION RESPIRATORY (INHALATION) at 07:56

## 2023-02-18 RX ADMIN — ATORVASTATIN CALCIUM 40 MG: 40 TABLET, FILM COATED ORAL at 09:39

## 2023-02-18 RX ADMIN — GABAPENTIN 300 MG: 300 CAPSULE ORAL at 09:39

## 2023-02-18 RX ADMIN — Medication 10 ML: at 09:42

## 2023-02-18 RX ADMIN — OXYCODONE 5 MG: 5 TABLET ORAL at 01:38

## 2023-02-18 ASSESSMENT — PAIN SCALES - GENERAL
PAINLEVEL_OUTOF10: 0
PAINLEVEL_OUTOF10: 10

## 2023-02-18 ASSESSMENT — PAIN DESCRIPTION - LOCATION: LOCATION: LEG

## 2023-02-18 ASSESSMENT — PAIN - FUNCTIONAL ASSESSMENT: PAIN_FUNCTIONAL_ASSESSMENT: ACTIVITIES ARE NOT PREVENTED

## 2023-02-18 ASSESSMENT — PAIN DESCRIPTION - ORIENTATION: ORIENTATION: RIGHT

## 2023-02-18 ASSESSMENT — PAIN SCALES - WONG BAKER: WONGBAKER_NUMERICALRESPONSE: 0

## 2023-02-18 ASSESSMENT — PAIN DESCRIPTION - DESCRIPTORS: DESCRIPTORS: ACHING;DISCOMFORT;SPASM

## 2023-02-18 NOTE — PROGRESS NOTES
Pt AOX4 - pt denied n/v, diarrhea, SOB & pain.  Pt denied any further needs at this time, bed in lowest and locked position with call light and bed side table within reach,

## 2023-02-18 NOTE — PLAN OF CARE
Problem: Discharge Planning  Goal: Discharge to home or other facility with appropriate resources  Outcome: Progressing     Problem: ABCDS Injury Assessment  Goal: Absence of physical injury  Outcome: Progressing     Problem: Pain  Goal: Verbalizes/displays adequate comfort level or baseline comfort level  Outcome: Progressing     Problem: Safety - Adult  Goal: Free from fall injury  Outcome: Progressing     Problem: Discharge Planning  Goal: Discharge to home or other facility with appropriate resources  2/18/2023 1210 by Marcie Rodriguez RN  Outcome: Completed  2/18/2023 1209 by Marcie Rodriguez RN  Outcome: Progressing     Problem: ABCDS Injury Assessment  Goal: Absence of physical injury  2/18/2023 1210 by Marcie Rodriguez RN  Outcome: Completed  2/18/2023 1209 by Marcie Rodriguez RN  Outcome: Progressing     Problem: Pain  Goal: Verbalizes/displays adequate comfort level or baseline comfort level  2/18/2023 1210 by Marcie Rodriguez RN  Outcome: Completed  2/18/2023 1209 by Marcie Rodriguez RN  Outcome: Progressing     Problem: Safety - Adult  Goal: Free from fall injury  2/18/2023 1210 by Marcie Rodriguez RN  Outcome: Completed  2/18/2023 1209 by Marcie Rodriguez RN  Outcome: Progressing

## 2023-02-18 NOTE — PROGRESS NOTES
4 Eyes Skin Assessment     NAME:  Rodrigo Garcia  YOB: 1940  MEDICAL RECORD NUMBER:  [id number]    The patient is being assessed for  Admission    I agree that One RN has performed a thorough Head to Toe Skin Assessment on the patient. ALL assessment sites listed below have been assessed. Areas assessed by both nurses:    Head, Face, Ears, Shoulders, Back, Chest, Arms, Elbows, Hands, Sacrum. Buttock, Coccyx, Ischium, and Legs. Feet and Heels        Does the Patient have a Wound?  No noted wound(s)       Shashi Prevention initiated by RN: No   Wound Care Orders initiated by RN: No    Pressure Injury (Stage 3,4, Unstageable, DTI, NWPT, and Complex wounds) if present, place referral order by RN under : No    New and Established Ostomies, if present place, referral order under : No      Nurse 1 eSignature: Electronically signed by Sherie Llanos RN on 2/17/23 at 7:02 PM EST    **SHARE this note so that the co-signing nurse can place an eSignature**    Nurse 2 eSignature: Electronically signed by Jaison Dick RN on 2/17/23 at 7:03 PM EST

## 2023-02-18 NOTE — PROGRESS NOTES
Physician Progress Note      PATIENT:               Helen Rojo  CSN #:                  [id number]  :                       1940  ADMIT DATE:       2023 10:40 AM  DISCH DATE:  RESPONDING  PROVIDER #:        Awais Mary MD          QUERY TEXT:    Pt admitted with Opiate OD. Pt noted to have AMS. If possible, please document   in the progress notes and discharge summary if you are evaluating and / or   treating any of the following: The medical record reflects the following:  Risk Factors: Opiate OD  UTI  Clinical Indicators:  per ED Provider \"On arrival patient is altered and with   pinpoint pupils and respiratory depression. She was administered 0.4 mg of   naloxone with improvement in her mental status. \"  per H&P \"As per ,   patient had started acting abnormally for the past 3 days, initially with   forgetfulness however progressed to hallucinations, patient had seen children   that were not there, ultimately presented to emergency when entered mental   status progressed where patient was confused. Patient received Narcan on   presentation with significant improvement, patient  Treatment: Narcan, CT head and monitoring  Options provided:  -- Drug-induced encephalopathy due to Opiate  -- Other - I will add my own diagnosis  -- Disagree - Not applicable / Not valid  -- Disagree - Clinically unable to determine / Unknown  -- Refer to Clinical Documentation Reviewer    PROVIDER RESPONSE TEXT:    This patient has drug-induced encephalopathy due to Opiate. Query created by: Agustín Turpin on 2023 9:44 PM      QUERY TEXT:    Patient admitted with Accidental Opiate OD. Documentation reflects Acute   respiratory failure  in ED Providers note(s) dated 23 . If possible,   please document in the progress notes and discharge summary if Acute   respiratory failure was:     The medical record reflects the following:  Risk Factors:  Accidental Opiate OD  Clinical Indicators:  RR 8, Spo2 81% RA    Spo2  93 % 2L NC  Treatment: Oxygen therapy and monitoring  Options provided:  -- Acute respiratory failure confirmed after study  -- Acute respiratory failure treated and resolved  -- Acute respiratory failure ruled out after study  -- Other - I will add my own diagnosis  -- Disagree - Not applicable / Not valid  -- Disagree - Clinically unable to determine / Unknown  -- Refer to Clinical Documentation Reviewer    PROVIDER RESPONSE TEXT:    Acute respiratory failure confirmed after study.     Query created by: Javon Jones on 2/18/2023 6:24 AM      Electronically signed by:  Brown Wilhelm MD 2/18/2023 7:33 AM

## 2023-02-18 NOTE — PROGRESS NOTES
[facility]    Respiratory Therapy   Home Oxygen Evaluation        Name: Rodrigo Garcia  Medical Record Number: [id number]  Age: [de-identified] y.o. Gender:  female   : 1940  Today's date: 2023  Room: [place] [address]-83      Assessment        BP (!) 130/59   Pulse 77   Temp 97.9 °F (36.6 °C) (Oral)   Resp 20   Ht 5' 8\" (1.727 m)   Wt 145 lb 8.1 oz (66 kg)   SpO2 95%   BMI 22.12 kg/m²     Patient Active Problem List   Diagnosis    Abdominal aortic aneurysm    Tobacco use disorder    Other and unspecified hyperlipidemia    Chronic obstructive pulmonary disease (COPD) (HCC)    History of AAA (abdominal aortic aneurysm) repair    Ascending aortic aneurysm    Drug overdose, accidental or unintentional, initial encounter       Social History:  Social History     Tobacco Use    Smoking status: Every Day     Packs/day: 2.00     Years: 50.00     Pack years: 100.00     Types: Cigarettes    Smokeless tobacco: Never    Tobacco comments:     started age 25   Substance Use Topics    Alcohol use: Yes     Alcohol/week: 0.0 standard drinks     Comment: 1 glass of wine per year. Drug use: No       Patient Room Air saturation at rest 86  %    Patient ambulated 3 minutes. (Minimum of 3 minutes unless Sp02 < 88% prior to 3 minute james)    Oxygen saturations of 88% or less on RA qualifies patient for Home Oxygen    Patient resting on 2  lmp  with an oxygen saturation of  95 %     Patient ambulated on 2 lpm with an oxygen saturation of 87%    Patient ambulated on 3 lpm with an oxygen saturation of 92%        Qualifying patient for home oxygen with ambulation and continuous flow  @ 3 lpm.      In your clinical assessment does the Patient Require Portable Oxygen Tanks?     Yes              [facility] [employer] contacted to follow care of patients home oxygen needs on 2023 at 9:59 AM    Patient/caregiver was educated on Home Oxygen process:  Yes      Level of patient/caregiver understanding able to:   [x] Verbalize understanding   [x] Demonstrate understanding       [] Teach back        [] Needs reinforcement        []  No available caregiver               []  Other:     Response to education:  Excellent     Time Spent with Home O2 Set Up:  40  minutes     Tequila Torres RCP on 2/18/2023 at 9:59 AM

## 2023-02-18 NOTE — DISCHARGE INSTR - COC
Continuity of Care Form    Patient Name: Estrella Ruiz   :  1940  MRN:  [id number]    Admit date:  2023  Discharge date:  ***    Code Status Order: Full Code   Advance Directives:     Admitting Physician:  Jerilyn Matta MD  PCP: Henry Crisostomo MD    Discharging Nurse: [facility] Unit/Room#: N3G-8005/2471-27  Discharging Unit Phone Number: ***    Emergency Contact:   Extended Emergency Contact Information  Primary Emergency Contact: Gilford Mannan  [facility] [address] [place] [address] Phone: [phone]  Relation: Spouse  Secondary Emergency Contact: Dominick Ramirez  Mobile Phone: [phone]  Relation: Other   needed? No    Past Surgical History:  Past Surgical History:   Procedure Laterality Date    ABDOMINAL AORTIC ANEURYSM REPAIR, ENDOVASCULAR  10/5/12    EVAR Zenith Graft    ANKLE FRACTURE SURGERY      APPENDECTOMY      as a child     BREAST BIOPSY Right 2015    Benign     CATARACT REMOVAL WITH IMPLANT Bilateral     CHOLECYSTECTOMY  2006    HYSTERECTOMY, TOTAL ABDOMINAL (CERVIX REMOVED)      TONSILLECTOMY      as a child    TOTAL KNEE ARTHROPLASTY Left        Immunization History: There is no immunization history on file for this patient.     Active Problems:  Patient Active Problem List   Diagnosis Code    Abdominal aortic aneurysm I71.40    Tobacco use disorder F17.200    Other and unspecified hyperlipidemia E78.5    Chronic obstructive pulmonary disease (COPD) ([facility] [address] J44.9    History of AAA (abdominal aortic aneurysm) repair Z20.846    Ascending aortic aneurysm I71.21    Drug overdose, accidental or unintentional, initial encounter T50.901A       Isolation/Infection:   Isolation            C Diff Contact          Patient Infection Status       Infection Onset Added Last Indicated Last Indicated By Review Planned Expiration Resolved Resolved By    C-diff Rule Out 23 Clostridium Difficile Toxin/Antigen (Ordered) 02/24/23 02/27/23              Nurse Assessment:  Last Vital Signs: BP (!) 130/59   Pulse 77   Temp 97.9 °F (36.6 °C) (Oral)   Resp 20   Ht 5' 8\" (1.727 m)   Wt 145 lb 8.1 oz (66 kg)   SpO2 95%   BMI 22.12 kg/m²     Last documented pain score (0-10 scale): Pain Level: 0  Last Weight:   Wt Readings from Last 1 Encounters:   02/18/23 145 lb 8.1 oz (66 kg)     Mental Status:  {IP PT MENTAL STATUS:20030}    IV Access:  { SAM IV ACCESS:145717052}    Nursing Mobility/ADLs:  Walking   {CHP DME NBRX:429336059}  Transfer  {CHP DME MLQW:310640629}  Bathing  {CHP DME JDVC:305958480}  Dressing  {CHP DME VVXO:984701984}  Toileting  {CHP DME FPYS:416470078}  Feeding  {CHP DME CZPK:821068808}  Med Admin  {CHP DME IESV:761184527}  Med Delivery   { SAM MED Delivery:663791631}    Wound Care Documentation and Therapy:        Elimination:  Continence: Bowel: {YES / SE:83445}  Bladder: {YES / KN:07074}  Urinary Catheter: {Urinary Catheter:591743497}   Colostomy/Ileostomy/Ileal Conduit: {YES / TC:63231}       Date of Last BM: ***    Intake/Output Summary (Last 24 hours) at 2/18/2023 1058  Last data filed at 2/17/2023 2158  Gross per 24 hour   Intake 610 ml   Output 200 ml   Net 410 ml     I/O last 3 completed shifts: In: 56 [P.O.:600;  I.V.:10]  Out: 200 [Urine:200]    Safety Concerns:     [employer] Safety Concerns:690992091}    Impairments/Disabilities:      [employer] Impairments/Disabilities:240458089}    Nutrition Therapy:  Current Nutrition Therapy:   [employer] Diet List:723872296}    Routes of Feeding: {CHP DME Other Feedings:549165696}  Liquids: {Slp liquid thickness:80227}  Daily Fluid Restriction: {CHP DME Yes amt example:865817183}  Last Modified Barium Swallow with Video (Video Swallowing Test): {Done Not Done ATEB:947668081}    Treatments at the Time of Hospital Discharge:   Respiratory Treatments: ***  Oxygen Therapy:  {Therapy; copd oxygen:58759}  Ventilator:    { CC Vent UNSR:305515168}    Rehab Therapies: {THERAPEUTIC INTERVENTION:7745037616}  Weight Bearing Status/Restrictions: 50Alan Alfonso CC Weight Bearin}  Other Medical Equipment (for information only, NOT a DME order):  {EQUIPMENT:152831097}  Other Treatments: ***    Patient's personal belongings (please select all that are sent with patient):  {CHP DME Belongings:840101445}    RN SIGNATURE:  {Esignature:398222105}    CASE MANAGEMENT/SOCIAL WORK SECTION    Inpatient Status Date: ***    Readmission Risk Assessment Score:  Readmission Risk              Risk of Unplanned Readmission:  12           Discharging to Facility/ Agency   Name:   Address:  Phone:  Fax:    Dialysis Facility (if applicable)   Name:  Address:  Dialysis Schedule:  Phone:  Fax:    / signature: {Esignature:298333279}    PHYSICIAN SECTION    Prognosis: {Prognosis:1124678300}    Condition at Discharge: 50Alan Alfonso Patient Condition:279674399}    Rehab Potential (if transferring to Rehab): {Prognosis:2209391522}    Recommended Labs or Other Treatments After Discharge: ***    Physician Certification: I certify the above information and transfer of Pauline Norwood  is necessary for the continuing treatment of the diagnosis listed and that she requires {Admit to Appropriate Level of Care:86231} for {GREATER/LESS:260606147} 30 days.      Update Admission H&P: {CHP DME Changes in WZX:126077200}    PHYSICIAN SIGNATURE:  {Esignature:676921866}

## 2023-02-18 NOTE — PROGRESS NOTES
Pt evening shift assessment complete. VSS and medication given as ordered. Having diarrhea, orders placed for cdiff r/o. Pt assessed for comfort needs. Pt does not express any additional needs at this time, resting comfortably in bed.

## 2023-02-18 NOTE — PROGRESS NOTES
Reviewed discharge and follow up information with pt and family and answered all questions. RT reviewed and answered all questions regarding home O2, home tank provided.  Pt stable w/out distress- family providing transport- wheelchair transportation to [facility] provided

## 2023-02-18 NOTE — PLAN OF CARE
Problem: Discharge Planning  Goal: Discharge to home or other facility with appropriate resources  2/17/2023 2138 by Jason Schwartz RN  Outcome: Bharath Christophe (Taken 2/17/2023 2104)  Discharge to home or other facility with appropriate resources: Identify barriers to discharge with patient and caregiver  2/17/2023 1752 by Yvette Sotelo RN  Outcome: Progressing     Problem: ABCDS Injury Assessment  Goal: Absence of physical injury  2/17/2023 2138 by Jason Schwartz RN  Outcome: Progressing  Flowsheets (Taken 2/17/2023 2104)  Absence of Physical Injury: Implement safety measures based on patient assessment  2/17/2023 1752 by Yvette Sotelo RN  Outcome: Progressing

## 2023-02-18 NOTE — CARE COORDINATION
DISCHARGE SUMMARY     DATE OF DISCHARGE: 02/18/2023    DISCHARGE DESTINATION: Home with spouse      TRANSPORTATION: Spouse    COMMENTS: SW spoke with pts dgtr, Maritza Coto [phone] to confirm d/c plans. Maritza Coto stated her father is on his way to transport pt home. Maritza Coto is aware oxygen has been arranged with [employer] and a tank has been delivered to pts room. No other needs noted at this time.    ELVIRA Castro [facility]  [phone]  Electronically signed by Arabella Baez on 2/18/2023 at 11:38 AM

## 2023-02-18 NOTE — DISCHARGE SUMMARY
Hospital Medicine Discharge Summary    Patient ID: Silevrio Markham      Patient's PCP: Oscar Segura MD    Admit Date: 2/17/2023     Discharge Date:   02/18/23      Admitting Provider: Ab Diaz MD     Discharge Provider: Ab Diaz MD     Discharge Diagnoses: Active Hospital Problems    Diagnosis     Drug overdose, accidental or unintentional, initial encounter [T50.903D]      Priority: Medium       The patient was seen and examined on day of discharge and this discharge summary is in conjunction with any daily progress note from day of discharge. Hospital Course:      [de-identified] y.o. female who presents to [facility] with altered mental status. Patient with a past medical history of COPD, current smoker, hyperlipidemia, chronic back pain, restless leg syndrome. Patient presented to emergency with altered mental status, history was acquired from the patient as well as her  who is at bedside. Patient had recently seen her pain management physician as well as neurosurgery, and is planned for procedure 2/28 for disc herniation, patient has been prescribed oxycodone 10 mg every 4 hours. As per , patient had started acting abnormally for the past 3 days, initially with forgetfulness however progressed to hallucinations, patient had seen children that were not there, ultimately presented to emergency when entered mental status progressed where patient was confused. Patient received Narcan on presentation with significant improvement, patient  which is at bedside noted that they were missing at least 5 oxycodone pills, patient had accidentally taken oxycodone over the prescribed limit. Patient did not show any signs of suicidal ideation and stated that she had been confused as she had recently seen both Drs. And may have confused instructions. .     Patient denies urinary symptoms however had not been reliable historian.       Patient continues to smoke 1 pack for the past 61 years, mother of 9, lives with her . Early dementia as documented in chart, patient is on memantine    Altered mental status. Accidental opioid overdose. Patient had been on oxycodone for chronic back pain, patient had accidentally ingested ? 5 tablets of oxycodone 10 mg over the span of 24 hours. Patient received Narcan on presentation with improvement in her mental status. Dose of narcan halfed to 5mg q 6 hours. I had spoken with the duaghter that the patient is NOT SAFE to take her own medications and requires supervision with all her meds. COPD. Chronic hypoxic respiratory failure. Qualified for home oxygen. Home oxygen ordered. Wall thickening in descending colon incidentally found. CT abdomen showed short segment strictures and wall thickening, patient denied any black stools. Colonoscopy as outpatient      Chronic back pain. Reduce dose of oxycodone. Dementia. Continue memantine. Restless leg syndrome   Continue home medications     Hyperlipidemia  Continue statin. Physical Exam Performed:     BP (!) 130/59   Pulse 77   Temp 97.9 °F (36.6 °C) (Oral)   Resp 20   Ht 5' 8\" (1.727 m)   Wt 145 lb 8.1 oz (66 kg)   SpO2 95%   BMI 22.12 kg/m²       General appearance:  No apparent distress, appears stated age and cooperative. HEENT:  Normal cephalic, atraumatic without obvious deformity. Pupils equal, round, and reactive to light. Extra ocular muscles intact. Conjunctivae/corneas clear. Neck: Supple, with full range of motion. No jugular venous distention. Trachea midline. Respiratory:  Normal respiratory effort. Clear to auscultation, bilaterally without Rales/Wheezes/Rhonchi. Cardiovascular:  Regular rate and rhythm with normal S1/S2 without murmurs, rubs or gallops. Abdomen: Soft, non-tender, non-distended with normal bowel sounds.   Musculoskeletal:  No clubbing, cyanosis or edema bilaterally. Full range of motion without deformity. Skin: Skin color, texture, turgor normal.  No rashes or lesions. Neurologic:  Neurovascularly intact without any focal sensory/motor deficits. Cranial nerves: II-XII intact, grossly non-focal.  Psychiatric:  Alert and oriented, thought content appropriate, normal insight  Capillary Refill: Brisk,< 3 seconds   Peripheral Pulses: +2 palpable, equal bilaterally       Labs: For convenience and continuity at follow-up the following most recent labs are provided:      CBC:    Lab Results   Component Value Date/Time    WBC 5.7 02/18/2023 05:34 AM    HGB 12.9 02/18/2023 05:34 AM    HCT 38.0 02/18/2023 05:34 AM     02/18/2023 05:34 AM       Renal:    Lab Results   Component Value Date/Time     02/18/2023 05:34 AM    K 3.8 02/18/2023 05:34 AM    K 4.3 02/17/2023 11:50 AM     02/18/2023 05:34 AM    CO2 28 02/18/2023 05:34 AM    BUN 21 02/18/2023 05:34 AM    CREATININE 1.1 02/18/2023 05:34 AM    CALCIUM 8.6 02/18/2023 05:34 AM         Significant Diagnostic Studies    Radiology:   XR CHEST PORTABLE   Final Result   Negative chest radiograph. CT ABDOMEN PELVIS W IV CONTRAST Additional Contrast? None   Final Result   Short segment strictures and wall thickening involving the descending colon   concerning for underlying malignancy. Additional irregular wall thickening   of the rectum. Consideration for follow-up colonoscopy recommended for   further characterization. Wall thickening with pericolonic fat stranding involving the ascending and   transverse colon suggestive of colitis. Small cystic lesion along the anterior right aspect of the pericardium   suggestive of a benign pericardial cyst.      Borderline aneurysmal dilatation of the descending thoracic aorta measuring   3.9 cm.                 Consults:     IP CONSULT TO HOSPITALIST  IP CONSULT TO GI    Disposition:  home      Condition at Discharge: Stable    Discharge Instructions/Follow-up:    - follow up with GI for colonoscopy ( info in follow up ). - reduce dose of oxy to 5 mg q 6hr.   - kelfelx for 2 days for UTI. - patient qualified for home oxygen. - I had spoken with the daughter that the patient is NOT SAFE to take her own medications and requires supervision with all her med    Code Status:  Full Code     Activity: activity as tolerated    Diet: regular diet      Discharge Medications:     Current Discharge Medication List             Details   cephALEXin (KEFLEX) 500 MG capsule Take 1 capsule by mouth 4 times daily for 2 days  Qty: 8 capsule, Refills: 0                Details   oxyCODONE HCl (OXY-IR) 10 MG immediate release tablet Take 0.5 tablets by mouth every 6 hours as needed for Pain for up to 3 days. Max Daily Amount: 20 mg  Qty: 1 tablet, Refills: 0    Associated Diagnoses: Chronic back pain, unspecified back location, unspecified back pain laterality                Details   alendronate (FOSAMAX) 70 MG tablet Take 70 mg by mouth every 7 days      donepezil (ARICEPT) 10 MG tablet Take 10 mg by mouth nightly      gabapentin (NEURONTIN) 300 MG capsule Take 300 mg by mouth 3 times daily. memantine (NAMENDA) 10 MG tablet Take 10 mg by mouth 2 times daily      vitamin D (CHOLECALCIFEROL) 25 MCG (1000 UT) TABS tablet Take 1,000 Units by mouth daily      Cranberry 500 MG CAPS Take 500 mg by mouth daily      PROAIR RESPICLICK 367 (90 Base) MCG/ACT aerosol powder inhalation Inhale 2 puffs into the lungs every 4 hours as needed      TRELEGY ELLIPTA 200-62.5-25 MCG/ACT AEPB inhaler Inhale 1 puff into the lungs daily      rOPINIRole (REQUIP) 4 MG tablet Take 4 mg by mouth 4 times daily as needed (restless legs)             Time Spent on discharge: 35 in the examination, evaluation, counseling and review of medications and discharge plan.       Signed:    Melanie Mcghee MD   2/18/2023      Thank you Julianna Sheth MD for the opportunity to be involved in this patient's care. If you have any questions or concerns, please feel free to contact me at [phone].

## 2023-02-18 NOTE — PROGRESS NOTES
Home O2 concentrator from [facility] delivered to Pt room, [facility] called with discharge and pt info.

## 2023-02-18 NOTE — DISCHARGE INSTR - DIET

## 2023-10-07 ENCOUNTER — APPOINTMENT (OUTPATIENT)
Dept: GENERAL RADIOLOGY | Age: 83
End: 2023-10-07
Payer: MEDICARE

## 2023-10-07 ENCOUNTER — APPOINTMENT (OUTPATIENT)
Dept: CT IMAGING | Age: 83
End: 2023-10-07
Payer: MEDICARE

## 2023-10-07 ENCOUNTER — HOSPITAL ENCOUNTER (EMERGENCY)
Age: 83
Discharge: NURSING, REHAB OR LONG-TERM CARE | End: 2023-10-08
Payer: MEDICARE

## 2023-10-07 VITALS
DIASTOLIC BLOOD PRESSURE: 97 MMHG | TEMPERATURE: 97.5 F | OXYGEN SATURATION: 98 % | RESPIRATION RATE: 16 BRPM | HEART RATE: 89 BPM | BODY MASS INDEX: 19.71 KG/M2 | SYSTOLIC BLOOD PRESSURE: 109 MMHG | WEIGHT: 129.63 LBS

## 2023-10-07 DIAGNOSIS — Z66 DO NOT RESUSCITATE STATUS: ICD-10-CM

## 2023-10-07 DIAGNOSIS — F02.C0 SEVERE LEWY BODY DEMENTIA, UNSPECIFIED WHETHER BEHAVIORAL, PSYCHOTIC, OR MOOD DISTURBANCE OR ANXIETY (HCC): ICD-10-CM
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
## 2023-10-07 DIAGNOSIS — N39.0 ACUTE UTI: ICD-10-CM

## 2023-10-07 DIAGNOSIS — G31.83 SEVERE LEWY BODY DEMENTIA, UNSPECIFIED WHETHER BEHAVIORAL, PSYCHOTIC, OR MOOD DISTURBANCE OR ANXIETY (HCC): ICD-10-CM

## 2023-10-07 LAB
ALBUMIN SERPL-MCNC: 4.1 G/DL (ref 3.4–5)
ALBUMIN/GLOB SERPL: 1.5 {RATIO} (ref 1.1–2.2)
ALP SERPL-CCNC: 88 U/L (ref 40–129)
ALT SERPL-CCNC: 11 U/L (ref 10–40)
ANION GAP SERPL CALCULATED.3IONS-SCNC: 9 MMOL/L (ref 3–16)
AST SERPL-CCNC: 18 U/L (ref 15–37)
BACTERIA URNS QL MICRO: ABNORMAL /HPF
BASOPHILS # BLD: 0 K/UL (ref 0–0.2)
BASOPHILS NFR BLD: 0.4 %
BILIRUB SERPL-MCNC: 0.5 MG/DL (ref 0–1)
BILIRUB UR QL STRIP.AUTO: NEGATIVE
BUN SERPL-MCNC: 32 MG/DL (ref 7–20)
CALCIUM SERPL-MCNC: 8.5 MG/DL (ref 8.3–10.6)
CHLORIDE SERPL-SCNC: 108 MMOL/L (ref 99–110)
CLARITY UR: ABNORMAL
CO2 SERPL-SCNC: 25 MMOL/L (ref 21–32)
COLOR UR: YELLOW
CREAT SERPL-MCNC: 0.9 MG/DL (ref 0.6–1.2)
DEPRECATED RDW RBC AUTO: 14.9 % (ref 12.4–15.4)
EOSINOPHIL # BLD: 0.1 K/UL (ref 0–0.6)
EOSINOPHIL NFR BLD: 1.7 %
EPI CELLS #/AREA URNS AUTO: 0 /HPF (ref 0–5)
GFR SERPLBLD CREATININE-BSD FMLA CKD-EPI: >60 ML/MIN/{1.73_M2}
GLUCOSE BLD-MCNC: 96 MG/DL (ref 70–99)
GLUCOSE SERPL-MCNC: 105 MG/DL (ref 70–99)
GLUCOSE UR STRIP.AUTO-MCNC: NEGATIVE MG/DL
HCT VFR BLD AUTO: 38.5 % (ref 36–48)
HGB BLD-MCNC: 12.6 G/DL (ref 12–16)
HGB UR QL STRIP.AUTO: NEGATIVE
HYALINE CASTS #/AREA URNS AUTO: 0 /LPF (ref 0–8)
KETONES UR STRIP.AUTO-MCNC: ABNORMAL MG/DL
LEUKOCYTE ESTERASE UR QL STRIP.AUTO: NEGATIVE
LYMPHOCYTES # BLD: 1.2 K/UL (ref 1–5.1)
LYMPHOCYTES NFR BLD: 16.5 %
MCH RBC QN AUTO: 31.9 PG (ref 26–34)
MCHC RBC AUTO-ENTMCNC: 32.8 G/DL (ref 31–36)
MCV RBC AUTO: 97.4 FL (ref 80–100)
MONOCYTES # BLD: 0.6 K/UL (ref 0–1.3)
MONOCYTES NFR BLD: 8 %
NEUTROPHILS # BLD: 5.3 K/UL (ref 1.7–7.7)
NEUTROPHILS NFR BLD: 73.4 %
NITRITE UR QL STRIP.AUTO: NEGATIVE
PERFORMED ON: NORMAL
PH UR STRIP.AUTO: 6 [PH] (ref 5–8)
PLATELET # BLD AUTO: 206 K/UL (ref 135–450)
PMV BLD AUTO: 9 FL (ref 5–10.5)
POTASSIUM SERPL-SCNC: 4 MMOL/L (ref 3.5–5.1)
PROT SERPL-MCNC: 6.9 G/DL (ref 6.4–8.2)
PROT UR STRIP.AUTO-MCNC: NEGATIVE MG/DL
RBC # BLD AUTO: 3.95 M/UL (ref 4–5.2)
RBC CLUMPS #/AREA URNS AUTO: 1 /HPF (ref 0–4)
SODIUM SERPL-SCNC: 142 MMOL/L (ref 136–145)
SP GR UR STRIP.AUTO: 1.01 (ref 1–1.03)
UA COMPLETE W REFLEX CULTURE PNL UR: ABNORMAL
UA DIPSTICK W REFLEX MICRO PNL UR: YES
URN SPEC COLLECT METH UR: ABNORMAL
UROBILINOGEN UR STRIP-ACNC: 1 E.U./DL
WBC # BLD AUTO: 7.2 K/UL (ref 4–11)
WBC #/AREA URNS AUTO: 1 /HPF (ref 0–5)

## 2023-10-07 PROCEDURE — 85025 COMPLETE CBC W/AUTO DIFF WBC: CPT

## 2023-10-07 PROCEDURE — 99284 EMERGENCY DEPT VISIT MOD MDM: CPT

## 2023-10-07 PROCEDURE — 70450 CT HEAD/BRAIN W/O DYE: CPT

## 2023-10-07 PROCEDURE — 80053 COMPREHEN METABOLIC PANEL: CPT

## 2023-10-07 PROCEDURE — 96372 THER/PROPH/DIAG INJ SC/IM: CPT

## 2023-10-07 PROCEDURE — 73560 X-RAY EXAM OF KNEE 1 OR 2: CPT

## 2023-10-07 PROCEDURE — 81001 URINALYSIS AUTO W/SCOPE: CPT

## 2023-10-07 PROCEDURE — 72125 CT NECK SPINE W/O DYE: CPT

## 2023-10-07 PROCEDURE — 6370000000 HC RX 637 (ALT 250 FOR IP)

## 2023-10-07 RX ORDER — CEPHALEXIN 500 MG/1
500 CAPSULE ORAL 4 TIMES DAILY
Qty: 12 CAPSULE | Refills: 0 | Status: SHIPPED | OUTPATIENT
Start: 2023-10-07 | End: 2023-10-07 | Stop reason: SDUPTHER

## 2023-10-07 RX ORDER — CEPHALEXIN 500 MG/1
500 CAPSULE ORAL 4 TIMES DAILY
Qty: 12 CAPSULE | Refills: 0 | Status: SHIPPED | OUTPATIENT
Start: 2023-10-07 | End: 2023-10-10

## 2023-10-07 RX ORDER — OLANZAPINE 10 MG/1
5 INJECTION, POWDER, LYOPHILIZED, FOR SOLUTION INTRAMUSCULAR
Status: COMPLETED | OUTPATIENT
Start: 2023-10-07 | End: 2023-10-08

## 2023-10-07 RX ORDER — OLANZAPINE 5 MG/1
5 TABLET, ORALLY DISINTEGRATING ORAL PRN
Status: COMPLETED | OUTPATIENT
Start: 2023-10-07 | End: 2023-10-07

## 2023-10-07 RX ORDER — OLANZAPINE 5 MG/1
5 TABLET ORAL NIGHTLY
Qty: 3 TABLET | Refills: 0 | Status: SHIPPED | OUTPATIENT
Start: 2023-10-07 | End: 2023-10-10

## 2023-10-07 RX ADMIN — OLANZAPINE 5 MG: 5 TABLET, ORALLY DISINTEGRATING ORAL at 22:21

## 2023-10-07 ASSESSMENT — PAIN - FUNCTIONAL ASSESSMENT: PAIN_FUNCTIONAL_ASSESSMENT: NONE - DENIES PAIN

## 2023-10-08 PROCEDURE — 6360000002 HC RX W HCPCS

## 2023-10-08 RX ADMIN — OLANZAPINE 5 MG: 10 INJECTION, POWDER, FOR SOLUTION INTRAMUSCULAR at 00:02

## 2023-10-08 NOTE — ED NOTES
Pt became agitated , was wandering around the [address] way. I attempted to redirect the patient to place and self but it was unsuccessful provider and charge nurse was notified and Zyprexa was ordered IM . Unable to get vitals signs due to patient agitation and confusion.      Gloria Johns, [address]  10/08/23 6824

## 2023-10-08 NOTE — ED NOTES
Pt was transported via [facility] care back to assisted living , all discharge paper work were sent with patient.      Eduar Blaze, [address]  10/08/23 2057

## 2023-10-12 ENCOUNTER — APPOINTMENT (OUTPATIENT)
Dept: GENERAL RADIOLOGY | Age: 83
End: 2023-10-12

## 2023-10-12 ENCOUNTER — APPOINTMENT (OUTPATIENT)
Dept: CT IMAGING | Age: 83
End: 2023-10-12

## 2023-10-12 ENCOUNTER — HOSPITAL ENCOUNTER (EMERGENCY)
Age: 83
Discharge: HOME | End: 2023-10-12
Attending: EMERGENCY MEDICINE

## 2023-10-12 VITALS
HEIGHT: 68 IN | HEART RATE: 85 BPM | TEMPERATURE: 98.7 F | BODY MASS INDEX: 20.18 KG/M2 | WEIGHT: 133.16 LBS | SYSTOLIC BLOOD PRESSURE: 135 MMHG | RESPIRATION RATE: 17 BRPM | DIASTOLIC BLOOD PRESSURE: 64 MMHG | OXYGEN SATURATION: 96 %

## 2023-10-12 DIAGNOSIS — G31.83 LEWY BODY DEMENTIA WITH OTHER BEHAVIORAL DISTURBANCE, UNSPECIFIED DEMENTIA SEVERITY (HCC): ICD-10-CM
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
## 2023-10-12 DIAGNOSIS — F02.818 LEWY BODY DEMENTIA WITH OTHER BEHAVIORAL DISTURBANCE, UNSPECIFIED DEMENTIA SEVERITY (HCC): ICD-10-CM

## 2023-10-12 DIAGNOSIS — S51.811A SKIN TEAR OF RIGHT FOREARM WITHOUT COMPLICATION, INITIAL ENCOUNTER: ICD-10-CM

## 2023-10-12 DIAGNOSIS — Z71.89 GOALS OF CARE, COUNSELING/DISCUSSION: ICD-10-CM

## 2023-10-12 DIAGNOSIS — Z91.81 AT RISK FOR FALLS: ICD-10-CM

## 2023-10-12 DIAGNOSIS — S61.411A SKIN TEAR OF RIGHT HAND WITHOUT COMPLICATION, INITIAL ENCOUNTER: ICD-10-CM

## 2023-10-12 PROCEDURE — 96372 THER/PROPH/DIAG INJ SC/IM: CPT

## 2023-10-12 PROCEDURE — 6360000002 HC RX W HCPCS: Performed by: EMERGENCY MEDICINE

## 2023-10-12 PROCEDURE — 73502 X-RAY EXAM HIP UNI 2-3 VIEWS: CPT

## 2023-10-12 PROCEDURE — 99284 EMERGENCY DEPT VISIT MOD MDM: CPT

## 2023-10-12 PROCEDURE — 70450 CT HEAD/BRAIN W/O DYE: CPT

## 2023-10-12 PROCEDURE — 71101 X-RAY EXAM UNILAT RIBS/CHEST: CPT

## 2023-10-12 PROCEDURE — 72125 CT NECK SPINE W/O DYE: CPT

## 2023-10-12 PROCEDURE — 6370000000 HC RX 637 (ALT 250 FOR IP): Performed by: EMERGENCY MEDICINE

## 2023-10-12 RX ORDER — OLANZAPINE 5 MG/1
5 TABLET, ORALLY DISINTEGRATING ORAL ONCE
Status: DISCONTINUED | OUTPATIENT
Start: 2023-10-12 | End: 2023-10-12 | Stop reason: HOSPADM

## 2023-10-12 RX ORDER — RIVASTIGMINE TARTRATE 1.5 MG/1
1.5 CAPSULE ORAL 2 TIMES DAILY
COMMUNITY

## 2023-10-12 RX ORDER — ACETAMINOPHEN 325 MG/1
650 TABLET ORAL ONCE
Status: COMPLETED | OUTPATIENT
Start: 2023-10-12 | End: 2023-10-12

## 2023-10-12 RX ORDER — NICOTINE 21 MG/24HR
1 PATCH, TRANSDERMAL 24 HOURS TRANSDERMAL EVERY 24 HOURS
COMMUNITY

## 2023-10-12 RX ORDER — LANOLIN ALCOHOL/MO/W.PET/CERES
3 CREAM (GRAM) TOPICAL DAILY
COMMUNITY

## 2023-10-12 RX ORDER — QUETIAPINE FUMARATE 50 MG/1
50 TABLET, EXTENDED RELEASE ORAL NIGHTLY
COMMUNITY

## 2023-10-12 RX ORDER — LORAZEPAM 2 MG/ML
1 INJECTION INTRAMUSCULAR ONCE
Status: COMPLETED | OUTPATIENT
Start: 2023-10-12 | End: 2023-10-12

## 2023-10-12 RX ORDER — AMLODIPINE BESYLATE 5 MG/1
5 TABLET ORAL DAILY
COMMUNITY

## 2023-10-12 RX ORDER — LIDOCAINE 4 G/G
1 PATCH TOPICAL ONCE
Status: DISCONTINUED | OUTPATIENT
Start: 2023-10-12 | End: 2023-10-12 | Stop reason: HOSPADM

## 2023-10-12 RX ADMIN — ACETAMINOPHEN 650 MG: 325 TABLET ORAL at 17:48

## 2023-10-12 RX ADMIN — LORAZEPAM 1 MG: 2 INJECTION INTRAMUSCULAR; INTRAVENOUS at 19:55

## 2023-10-12 ASSESSMENT — PAIN SCALES - GENERAL
PAINLEVEL_OUTOF10: 6
PAINLEVEL_OUTOF10: 4

## 2023-10-12 ASSESSMENT — PAIN DESCRIPTION - PAIN TYPE: TYPE: ACUTE PAIN

## 2023-10-12 ASSESSMENT — PAIN - FUNCTIONAL ASSESSMENT: PAIN_FUNCTIONAL_ASSESSMENT: 0-10

## 2023-10-12 NOTE — ED PROVIDER NOTES
Disposition Considerations (tests considered but not done, Shared Decision Making, Pt Expectation of Test or Tx.): Appropriate for outpatient management      I estimate there is low risk for sepsis, MI, stroke, tamponade, PTX, toxicity, or other life threatening etiology. Given the best available information and clinical assessment I estimate the risk of hospitalization to be greater than risk of treatment at home. We discussed and I explained the risk could rapidly change and return precautions and instructions given. Discharge disposition is reasonable. I am the Primary Clinician of Record. FINAL IMPRESSION      1. Fall, initial encounter    2. Skin tear of right hand without complication, initial encounter    3. Skin tear of right forearm without complication, initial encounter    4. Lewy body dementia with other behavioral disturbance, unspecified dementia severity ([facility])    5. At risk for falls    6.  Goals of care, counseling/discussion          DISPOSITION/PLAN   DISPOSITION Decision To Discharge 10/12/2023 06:25:24 PM      PATIENT REFERRED TO:  Domitila Aguilar MD  [address] [address]  [phone]    Call   For follow up appointment, As needed      DISCHARGE MEDICATIONS:  Discharge Medication List as of 10/12/2023  8:50 PM             (Please note that portions of this note were completed with a voice recognition program.  Efforts were made to edit the dictations but occasionally words are mis-transcribed.)    Emiliana Grande MD (electronically signed)  Attending Emergency Physician        Emiliana Grande MD  10/12/23 3833

## 2023-10-12 NOTE — ED NOTES
Wound care preformed on rt hand and rt forearm, silicone dressing applied to top of rt hand. Dressing and ace wrap applied.       Hua Muhammad RN  10/12/23 2718

## 2023-10-30 DIAGNOSIS — I71.21 ANEURYSM OF ASCENDING AORTA WITHOUT RUPTURE (HCC): Primary | ICD-10-CM
